# Patient Record
Sex: FEMALE | Race: BLACK OR AFRICAN AMERICAN | NOT HISPANIC OR LATINO | Employment: PART TIME | URBAN - METROPOLITAN AREA
[De-identification: names, ages, dates, MRNs, and addresses within clinical notes are randomized per-mention and may not be internally consistent; named-entity substitution may affect disease eponyms.]

---

## 2017-04-23 ENCOUNTER — HOSPITAL ENCOUNTER (EMERGENCY)
Facility: HOSPITAL | Age: 27
Discharge: HOME | End: 2017-04-23
Attending: EMERGENCY MEDICINE | Admitting: EMERGENCY MEDICINE
Payer: COMMERCIAL

## 2017-04-23 VITALS
BODY MASS INDEX: 32.25 KG/M2 | TEMPERATURE: 98.3 F | SYSTOLIC BLOOD PRESSURE: 144 MMHG | DIASTOLIC BLOOD PRESSURE: 67 MMHG | WEIGHT: 160 LBS | HEIGHT: 59 IN | HEART RATE: 87 BPM | OXYGEN SATURATION: 100 % | RESPIRATION RATE: 18 BRPM

## 2017-04-23 DIAGNOSIS — J02.9 SORE THROAT: Primary | ICD-10-CM

## 2017-04-23 LAB — S PYO AG THROAT QL: NEGATIVE

## 2017-04-23 PROCEDURE — 87430 STREP A AG IA: CPT | Performed by: NURSE PRACTITIONER

## 2017-04-23 PROCEDURE — 99283 EMERGENCY DEPT VISIT LOW MDM: CPT

## 2017-04-23 PROCEDURE — 87070 CULTURE OTHR SPECIMN AEROBIC: CPT | Performed by: NURSE PRACTITIONER

## 2017-04-23 RX ORDER — NAPROXEN 500 MG/1
500 TABLET ORAL 2 TIMES DAILY WITH MEALS
Qty: 10 TABLET | Refills: 0 | Status: SHIPPED | OUTPATIENT
Start: 2017-04-23 | End: 2018-01-17 | Stop reason: HOSPADM

## 2017-04-25 ENCOUNTER — HOSPITAL ENCOUNTER (EMERGENCY)
Facility: HOSPITAL | Age: 27
Discharge: HOME | End: 2017-04-25
Attending: EMERGENCY MEDICINE
Payer: COMMERCIAL

## 2017-04-25 VITALS
SYSTOLIC BLOOD PRESSURE: 132 MMHG | BODY MASS INDEX: 31.61 KG/M2 | DIASTOLIC BLOOD PRESSURE: 78 MMHG | RESPIRATION RATE: 18 BRPM | WEIGHT: 156.53 LBS | HEART RATE: 100 BPM | OXYGEN SATURATION: 98 % | TEMPERATURE: 98.9 F

## 2017-04-25 DIAGNOSIS — H10.33 ACUTE CONJUNCTIVITIS OF BOTH EYES, UNSPECIFIED ACUTE CONJUNCTIVITIS TYPE: Primary | ICD-10-CM

## 2017-04-25 LAB — BACTERIA THROAT CULT: NORMAL

## 2017-04-25 PROCEDURE — 99282 EMERGENCY DEPT VISIT SF MDM: CPT

## 2017-04-25 RX ORDER — SULFACETAMIDE SODIUM 100 MG/ML
2 SOLUTION/ DROPS OPHTHALMIC 4 TIMES DAILY
Qty: 15 ML | Refills: 0 | Status: SHIPPED | OUTPATIENT
Start: 2017-04-25 | End: 2017-05-05

## 2017-04-25 RX ORDER — SULFACETAMIDE SODIUM 100 MG/ML
2 SOLUTION/ DROPS OPHTHALMIC ONCE
Status: COMPLETED | OUTPATIENT
Start: 2017-04-25 | End: 2017-04-25

## 2017-04-25 RX ADMIN — SULFACETAMIDE SODIUM 2 DROP: 100 SOLUTION OPHTHALMIC at 05:14

## 2017-07-05 ENCOUNTER — ALLSCRIPTS OFFICE VISIT (OUTPATIENT)
Dept: OTHER | Facility: OTHER | Age: 27
End: 2017-07-05

## 2017-07-05 ENCOUNTER — OB ABSTRACT (OUTPATIENT)
Dept: OBGYN CLINIC | Facility: CLINIC | Age: 27
End: 2017-07-05

## 2017-07-05 DIAGNOSIS — O30.039 MONOCHORIONIC DIAMNIOTIC TWIN PREGNANCY: ICD-10-CM

## 2017-07-05 DIAGNOSIS — Z34.92 ENCOUNTER FOR SUPERVISION OF NORMAL PREGNANCY IN SECOND TRIMESTER: ICD-10-CM

## 2017-07-06 ENCOUNTER — ALLSCRIPTS OFFICE VISIT (OUTPATIENT)
Dept: PERINATAL CARE | Facility: CLINIC | Age: 27
End: 2017-07-06
Payer: COMMERCIAL

## 2017-07-06 ENCOUNTER — GENERIC CONVERSION - ENCOUNTER (OUTPATIENT)
Dept: OTHER | Facility: OTHER | Age: 27
End: 2017-07-06

## 2017-07-06 PROCEDURE — 76801 OB US < 14 WKS SINGLE FETUS: CPT | Performed by: OBSTETRICS & GYNECOLOGY

## 2017-07-06 PROCEDURE — 76802 OB US < 14 WKS ADDL FETUS: CPT | Performed by: OBSTETRICS & GYNECOLOGY

## 2017-07-17 ENCOUNTER — GENERIC CONVERSION - ENCOUNTER (OUTPATIENT)
Dept: OTHER | Facility: OTHER | Age: 27
End: 2017-07-17

## 2017-07-18 ENCOUNTER — LAB REQUISITION (OUTPATIENT)
Dept: LAB | Facility: HOSPITAL | Age: 27
End: 2017-07-18
Payer: COMMERCIAL

## 2017-07-18 DIAGNOSIS — O30.039 MONOCHORIONIC DIAMNIOTIC TWIN PREGNANCY: ICD-10-CM

## 2017-07-18 PROCEDURE — 87491 CHLMYD TRACH DNA AMP PROBE: CPT | Performed by: NURSE PRACTITIONER

## 2017-07-18 PROCEDURE — 87591 N.GONORRHOEAE DNA AMP PROB: CPT | Performed by: NURSE PRACTITIONER

## 2017-07-19 LAB
CHLAMYDIA DNA CVX QL NAA+PROBE: NORMAL
N GONORRHOEA DNA GENITAL QL NAA+PROBE: NORMAL

## 2017-07-27 ENCOUNTER — GENERIC CONVERSION - ENCOUNTER (OUTPATIENT)
Dept: OTHER | Facility: OTHER | Age: 27
End: 2017-07-27

## 2017-08-03 ENCOUNTER — GENERIC CONVERSION - ENCOUNTER (OUTPATIENT)
Dept: OTHER | Facility: OTHER | Age: 27
End: 2017-08-03

## 2017-08-03 ENCOUNTER — ALLSCRIPTS OFFICE VISIT (OUTPATIENT)
Dept: PERINATAL CARE | Facility: CLINIC | Age: 27
End: 2017-08-03
Payer: COMMERCIAL

## 2017-08-03 PROCEDURE — 76801 OB US < 14 WKS SINGLE FETUS: CPT | Performed by: OBSTETRICS & GYNECOLOGY

## 2017-08-03 PROCEDURE — 76802 OB US < 14 WKS ADDL FETUS: CPT | Performed by: OBSTETRICS & GYNECOLOGY

## 2017-08-03 PROCEDURE — 76814 OB US NUCHAL MEAS ADD-ON: CPT | Performed by: OBSTETRICS & GYNECOLOGY

## 2017-08-03 PROCEDURE — 76813 OB US NUCHAL MEAS 1 GEST: CPT | Performed by: OBSTETRICS & GYNECOLOGY

## 2017-08-09 ENCOUNTER — APPOINTMENT (OUTPATIENT)
Dept: LAB | Facility: CLINIC | Age: 27
End: 2017-08-09
Payer: COMMERCIAL

## 2017-08-09 ENCOUNTER — LAB REQUISITION (OUTPATIENT)
Dept: LAB | Facility: HOSPITAL | Age: 27
End: 2017-08-09
Payer: COMMERCIAL

## 2017-08-09 ENCOUNTER — TRANSCRIBE ORDERS (OUTPATIENT)
Dept: LAB | Facility: CLINIC | Age: 27
End: 2017-08-09

## 2017-08-09 DIAGNOSIS — Z34.92 ENCOUNTER FOR SUPERVISION OF NORMAL PREGNANCY IN SECOND TRIMESTER: ICD-10-CM

## 2017-08-09 DIAGNOSIS — O30.039 MONOCHORIONIC DIAMNIOTIC TWIN PREGNANCY: ICD-10-CM

## 2017-08-09 DIAGNOSIS — Z34.90 ENCOUNTER FOR SUPERVISION OF NORMAL PREGNANCY: ICD-10-CM

## 2017-08-09 LAB
ABO GROUP BLD: NORMAL
BACTERIA UR QL AUTO: ABNORMAL /HPF
BASOPHILS # BLD AUTO: 0.01 THOUSANDS/ΜL (ref 0–0.1)
BASOPHILS NFR BLD AUTO: 0 % (ref 0–1)
BILIRUB UR QL STRIP: NEGATIVE
BLD GP AB SCN SERPL QL: NEGATIVE
CLARITY UR: ABNORMAL
COLOR UR: YELLOW
EOSINOPHIL # BLD AUTO: 0.07 THOUSAND/ΜL (ref 0–0.61)
EOSINOPHIL NFR BLD AUTO: 1 % (ref 0–6)
ERYTHROCYTE [DISTWIDTH] IN BLOOD BY AUTOMATED COUNT: 14 % (ref 11.6–15.1)
GLUCOSE 1H P 50 G GLC PO SERPL-MCNC: 153 MG/DL
GLUCOSE UR STRIP-MCNC: ABNORMAL MG/DL
HBV SURFACE AG SER QL: NORMAL
HCT VFR BLD AUTO: 37.7 % (ref 34.8–46.1)
HGB BLD-MCNC: 12.6 G/DL (ref 11.5–15.4)
HGB UR QL STRIP.AUTO: NEGATIVE
HYALINE CASTS #/AREA URNS LPF: ABNORMAL /LPF
KETONES UR STRIP-MCNC: ABNORMAL MG/DL
LEUKOCYTE ESTERASE UR QL STRIP: ABNORMAL
LYMPHOCYTES # BLD AUTO: 2.13 THOUSANDS/ΜL (ref 0.6–4.47)
LYMPHOCYTES NFR BLD AUTO: 22 % (ref 14–44)
MCH RBC QN AUTO: 29.4 PG (ref 26.8–34.3)
MCHC RBC AUTO-ENTMCNC: 33.4 G/DL (ref 31.4–37.4)
MCV RBC AUTO: 88 FL (ref 82–98)
MONOCYTES # BLD AUTO: 0.31 THOUSAND/ΜL (ref 0.17–1.22)
MONOCYTES NFR BLD AUTO: 3 % (ref 4–12)
NEUTROPHILS # BLD AUTO: 7.3 THOUSANDS/ΜL (ref 1.85–7.62)
NEUTS SEG NFR BLD AUTO: 74 % (ref 43–75)
NITRITE UR QL STRIP: NEGATIVE
NON-SQ EPI CELLS URNS QL MICRO: ABNORMAL /HPF
NRBC BLD AUTO-RTO: 0 /100 WBCS
PH UR STRIP.AUTO: 6 [PH] (ref 4.5–8)
PLATELET # BLD AUTO: 242 THOUSANDS/UL (ref 149–390)
PMV BLD AUTO: 12.8 FL (ref 8.9–12.7)
PROT UR STRIP-MCNC: ABNORMAL MG/DL
RBC # BLD AUTO: 4.29 MILLION/UL (ref 3.81–5.12)
RBC #/AREA URNS AUTO: ABNORMAL /HPF
RH BLD: POSITIVE
RUBV IGG SERPL IA-ACNC: 23.5 IU/ML
SP GR UR STRIP.AUTO: 1.03 (ref 1–1.03)
SPECIMEN EXPIRATION DATE: NORMAL
UROBILINOGEN UR QL STRIP.AUTO: 0.2 E.U./DL
WBC # BLD AUTO: 9.85 THOUSAND/UL (ref 4.31–10.16)
WBC #/AREA URNS AUTO: ABNORMAL /HPF

## 2017-08-09 PROCEDURE — 81001 URINALYSIS AUTO W/SCOPE: CPT

## 2017-08-09 PROCEDURE — 36415 COLL VENOUS BLD VENIPUNCTURE: CPT

## 2017-08-09 PROCEDURE — 80081 OBSTETRIC PANEL INC HIV TSTG: CPT

## 2017-08-09 PROCEDURE — 82950 GLUCOSE TEST: CPT

## 2017-08-09 PROCEDURE — 87086 URINE CULTURE/COLONY COUNT: CPT

## 2017-08-09 PROCEDURE — 83020 HEMOGLOBIN ELECTROPHORESIS: CPT

## 2017-08-10 ENCOUNTER — GENERIC CONVERSION - ENCOUNTER (OUTPATIENT)
Dept: OTHER | Facility: OTHER | Age: 27
End: 2017-08-10

## 2017-08-10 LAB
BACTERIA UR CULT: NORMAL
RPR SER QL: NORMAL

## 2017-08-11 LAB — HIV 1+2 AB+HIV1 P24 AG SERPL QL IA: NORMAL

## 2017-08-13 ENCOUNTER — GENERIC CONVERSION - ENCOUNTER (OUTPATIENT)
Dept: OTHER | Facility: OTHER | Age: 27
End: 2017-08-13

## 2017-08-13 DIAGNOSIS — O30.039 MONOCHORIONIC DIAMNIOTIC TWIN PREGNANCY: ICD-10-CM

## 2017-08-13 DIAGNOSIS — R73.02 IMPAIRED GLUCOSE TOLERANCE: ICD-10-CM

## 2017-08-14 LAB
HGB A MFR BLD: 2.4 % (ref 0.7–3.1)
HGB A MFR BLD: 97.6 % (ref 94–98)
HGB C MFR BLD: 0 %
HGB F MFR BLD: 0 % (ref 0–2)
HGB FRACT BLD-IMP: NORMAL
HGB S BLD QL SOLY: NEGATIVE
HGB S MFR BLD: 0 %

## 2017-08-22 ENCOUNTER — GENERIC CONVERSION - ENCOUNTER (OUTPATIENT)
Dept: OTHER | Facility: OTHER | Age: 27
End: 2017-08-22

## 2017-08-24 ENCOUNTER — ALLSCRIPTS OFFICE VISIT (OUTPATIENT)
Dept: PERINATAL CARE | Facility: CLINIC | Age: 27
End: 2017-08-24
Payer: COMMERCIAL

## 2017-08-24 ENCOUNTER — GENERIC CONVERSION - ENCOUNTER (OUTPATIENT)
Dept: OTHER | Facility: OTHER | Age: 27
End: 2017-08-24

## 2017-08-24 PROCEDURE — 76817 TRANSVAGINAL US OBSTETRIC: CPT | Performed by: OBSTETRICS & GYNECOLOGY

## 2017-08-24 PROCEDURE — 76810 OB US >/= 14 WKS ADDL FETUS: CPT | Performed by: OBSTETRICS & GYNECOLOGY

## 2017-08-24 PROCEDURE — 76805 OB US >/= 14 WKS SNGL FETUS: CPT | Performed by: OBSTETRICS & GYNECOLOGY

## 2017-08-26 ENCOUNTER — APPOINTMENT (OUTPATIENT)
Dept: LAB | Facility: CLINIC | Age: 27
End: 2017-08-26
Payer: COMMERCIAL

## 2017-08-26 DIAGNOSIS — O30.039 MONOCHORIONIC DIAMNIOTIC TWIN PREGNANCY: ICD-10-CM

## 2017-08-26 DIAGNOSIS — R73.02 IMPAIRED GLUCOSE TOLERANCE: ICD-10-CM

## 2017-08-26 LAB
GLUCOSE 1H P 100 G GLC PO SERPL-MCNC: 131 MG/DL (ref 65–179)
GLUCOSE 2H P 100 G GLC PO SERPL-MCNC: 97 MG/DL (ref 65–154)
GLUCOSE 3H P 100 G GLC PO SERPL-MCNC: 102 MG/DL (ref 65–139)
GLUCOSE P FAST SERPL-MCNC: 84 MG/DL (ref 65–99)

## 2017-08-26 PROCEDURE — 82951 GLUCOSE TOLERANCE TEST (GTT): CPT

## 2017-08-26 PROCEDURE — 82952 GTT-ADDED SAMPLES: CPT

## 2017-08-26 PROCEDURE — 82948 REAGENT STRIP/BLOOD GLUCOSE: CPT

## 2017-08-26 PROCEDURE — 36415 COLL VENOUS BLD VENIPUNCTURE: CPT

## 2017-08-28 LAB — GLUCOSE SERPL-MCNC: 80 MG/DL (ref 65–140)

## 2017-09-07 ENCOUNTER — GENERIC CONVERSION - ENCOUNTER (OUTPATIENT)
Dept: OTHER | Facility: OTHER | Age: 27
End: 2017-09-07

## 2017-09-07 ENCOUNTER — ALLSCRIPTS OFFICE VISIT (OUTPATIENT)
Dept: PERINATAL CARE | Facility: CLINIC | Age: 27
End: 2017-09-07
Payer: COMMERCIAL

## 2017-09-07 PROCEDURE — 76821 MIDDLE CEREBRAL ARTERY ECHO: CPT | Performed by: OBSTETRICS & GYNECOLOGY

## 2017-09-07 PROCEDURE — 76816 OB US FOLLOW-UP PER FETUS: CPT | Performed by: OBSTETRICS & GYNECOLOGY

## 2017-09-13 ENCOUNTER — GENERIC CONVERSION - ENCOUNTER (OUTPATIENT)
Dept: OTHER | Facility: OTHER | Age: 27
End: 2017-09-13

## 2017-09-20 ENCOUNTER — GENERIC CONVERSION - ENCOUNTER (OUTPATIENT)
Dept: OTHER | Facility: OTHER | Age: 27
End: 2017-09-20

## 2017-09-20 ENCOUNTER — ALLSCRIPTS OFFICE VISIT (OUTPATIENT)
Dept: PERINATAL CARE | Facility: CLINIC | Age: 27
End: 2017-09-20
Payer: COMMERCIAL

## 2017-09-20 PROCEDURE — 76812 OB US DETAILED ADDL FETUS: CPT | Performed by: OBSTETRICS & GYNECOLOGY

## 2017-09-20 PROCEDURE — 76821 MIDDLE CEREBRAL ARTERY ECHO: CPT | Performed by: OBSTETRICS & GYNECOLOGY

## 2017-09-20 PROCEDURE — 76811 OB US DETAILED SNGL FETUS: CPT | Performed by: OBSTETRICS & GYNECOLOGY

## 2017-09-20 PROCEDURE — 76817 TRANSVAGINAL US OBSTETRIC: CPT | Performed by: OBSTETRICS & GYNECOLOGY

## 2017-10-05 ENCOUNTER — GENERIC CONVERSION - ENCOUNTER (OUTPATIENT)
Dept: OTHER | Facility: OTHER | Age: 27
End: 2017-10-05

## 2017-10-05 ENCOUNTER — APPOINTMENT (OUTPATIENT)
Dept: PERINATAL CARE | Facility: CLINIC | Age: 27
End: 2017-10-05
Payer: COMMERCIAL

## 2017-10-05 PROCEDURE — 76827 ECHO EXAM OF FETAL HEART: CPT | Performed by: OBSTETRICS & GYNECOLOGY

## 2017-10-05 PROCEDURE — 76816 OB US FOLLOW-UP PER FETUS: CPT | Performed by: OBSTETRICS & GYNECOLOGY

## 2017-10-05 PROCEDURE — 76825 ECHO EXAM OF FETAL HEART: CPT | Performed by: OBSTETRICS & GYNECOLOGY

## 2017-10-05 PROCEDURE — 93325 DOPPLER ECHO COLOR FLOW MAPG: CPT | Performed by: OBSTETRICS & GYNECOLOGY

## 2017-10-09 ENCOUNTER — ALLSCRIPTS OFFICE VISIT (OUTPATIENT)
Dept: OTHER | Facility: OTHER | Age: 27
End: 2017-10-09

## 2017-10-09 DIAGNOSIS — M79.606 PAIN OF LOWER EXTREMITY: ICD-10-CM

## 2017-10-09 DIAGNOSIS — Z34.82 ENCOUNTER FOR SUPERVISION OF OTHER NORMAL PREGNANCY, SECOND TRIMESTER: ICD-10-CM

## 2017-10-11 ENCOUNTER — GENERIC CONVERSION - ENCOUNTER (OUTPATIENT)
Dept: OTHER | Facility: OTHER | Age: 27
End: 2017-10-11

## 2017-11-01 ENCOUNTER — GENERIC CONVERSION - ENCOUNTER (OUTPATIENT)
Dept: OTHER | Facility: OTHER | Age: 27
End: 2017-11-01

## 2017-11-01 ENCOUNTER — APPOINTMENT (OUTPATIENT)
Dept: PERINATAL CARE | Facility: CLINIC | Age: 27
End: 2017-11-01
Payer: COMMERCIAL

## 2017-11-01 PROCEDURE — 76821 MIDDLE CEREBRAL ARTERY ECHO: CPT | Performed by: OBSTETRICS & GYNECOLOGY

## 2017-11-01 PROCEDURE — 76816 OB US FOLLOW-UP PER FETUS: CPT | Performed by: OBSTETRICS & GYNECOLOGY

## 2017-11-06 ENCOUNTER — GENERIC CONVERSION - ENCOUNTER (OUTPATIENT)
Dept: OTHER | Facility: OTHER | Age: 27
End: 2017-11-06

## 2017-11-08 ENCOUNTER — GENERIC CONVERSION - ENCOUNTER (OUTPATIENT)
Dept: OTHER | Facility: OTHER | Age: 27
End: 2017-11-08

## 2017-11-08 ENCOUNTER — LAB REQUISITION (OUTPATIENT)
Dept: LAB | Facility: HOSPITAL | Age: 27
End: 2017-11-08
Payer: COMMERCIAL

## 2017-11-08 ENCOUNTER — ALLSCRIPTS OFFICE VISIT (OUTPATIENT)
Dept: OTHER | Facility: OTHER | Age: 27
End: 2017-11-08

## 2017-11-08 DIAGNOSIS — N39.0 URINARY TRACT INFECTION: ICD-10-CM

## 2017-11-08 DIAGNOSIS — Z34.82 ENCOUNTER FOR SUPERVISION OF OTHER NORMAL PREGNANCY, SECOND TRIMESTER: ICD-10-CM

## 2017-11-08 PROCEDURE — 87086 URINE CULTURE/COLONY COUNT: CPT | Performed by: OBSTETRICS & GYNECOLOGY

## 2017-11-10 LAB — BACTERIA UR CULT: NORMAL

## 2017-11-22 ENCOUNTER — ALLSCRIPTS OFFICE VISIT (OUTPATIENT)
Dept: OTHER | Facility: OTHER | Age: 27
End: 2017-11-22

## 2017-11-22 ENCOUNTER — GENERIC CONVERSION - ENCOUNTER (OUTPATIENT)
Dept: OTHER | Facility: OTHER | Age: 27
End: 2017-11-22

## 2017-11-22 DIAGNOSIS — Z34.82 ENCOUNTER FOR SUPERVISION OF OTHER NORMAL PREGNANCY, SECOND TRIMESTER: ICD-10-CM

## 2017-11-28 ENCOUNTER — APPOINTMENT (OUTPATIENT)
Dept: PERINATAL CARE | Facility: MEDICAL CENTER | Age: 27
End: 2017-11-28
Payer: COMMERCIAL

## 2017-11-28 ENCOUNTER — GENERIC CONVERSION - ENCOUNTER (OUTPATIENT)
Dept: OTHER | Facility: OTHER | Age: 27
End: 2017-11-28

## 2017-11-28 PROCEDURE — 76816 OB US FOLLOW-UP PER FETUS: CPT | Performed by: OBSTETRICS & GYNECOLOGY

## 2017-11-28 PROCEDURE — 76821 MIDDLE CEREBRAL ARTERY ECHO: CPT | Performed by: OBSTETRICS & GYNECOLOGY

## 2017-12-14 ENCOUNTER — GENERIC CONVERSION - ENCOUNTER (OUTPATIENT)
Dept: OTHER | Facility: OTHER | Age: 27
End: 2017-12-14

## 2017-12-15 ENCOUNTER — APPOINTMENT (OUTPATIENT)
Dept: PERINATAL CARE | Facility: MEDICAL CENTER | Age: 27
End: 2017-12-15
Payer: COMMERCIAL

## 2017-12-15 ENCOUNTER — GENERIC CONVERSION - ENCOUNTER (OUTPATIENT)
Dept: OTHER | Facility: OTHER | Age: 27
End: 2017-12-15

## 2017-12-15 PROCEDURE — 76816 OB US FOLLOW-UP PER FETUS: CPT | Performed by: OBSTETRICS & GYNECOLOGY

## 2017-12-15 PROCEDURE — 76821 MIDDLE CEREBRAL ARTERY ECHO: CPT | Performed by: OBSTETRICS & GYNECOLOGY

## 2017-12-22 ENCOUNTER — GENERIC CONVERSION - ENCOUNTER (OUTPATIENT)
Dept: OTHER | Facility: OTHER | Age: 27
End: 2017-12-22

## 2017-12-22 ENCOUNTER — APPOINTMENT (OUTPATIENT)
Dept: PERINATAL CARE | Facility: MEDICAL CENTER | Age: 27
End: 2017-12-22
Payer: COMMERCIAL

## 2017-12-22 PROCEDURE — 76821 MIDDLE CEREBRAL ARTERY ECHO: CPT | Performed by: OBSTETRICS & GYNECOLOGY

## 2017-12-22 PROCEDURE — 76816 OB US FOLLOW-UP PER FETUS: CPT | Performed by: OBSTETRICS & GYNECOLOGY

## 2017-12-22 PROCEDURE — 59025 FETAL NON-STRESS TEST: CPT | Performed by: OBSTETRICS & GYNECOLOGY

## 2018-01-02 ENCOUNTER — ALLSCRIPTS OFFICE VISIT (OUTPATIENT)
Dept: PERINATAL CARE | Facility: MEDICAL CENTER | Age: 28
End: 2018-01-02
Payer: COMMERCIAL

## 2018-01-02 ENCOUNTER — APPOINTMENT (OUTPATIENT)
Dept: PERINATAL CARE | Facility: MEDICAL CENTER | Age: 28
End: 2018-01-02
Payer: COMMERCIAL

## 2018-01-02 ENCOUNTER — GENERIC CONVERSION - ENCOUNTER (OUTPATIENT)
Dept: OTHER | Facility: OTHER | Age: 28
End: 2018-01-02

## 2018-01-02 PROCEDURE — 76816 OB US FOLLOW-UP PER FETUS: CPT | Performed by: OBSTETRICS & GYNECOLOGY

## 2018-01-02 PROCEDURE — 59025 FETAL NON-STRESS TEST: CPT | Performed by: OBSTETRICS & GYNECOLOGY

## 2018-01-09 ENCOUNTER — APPOINTMENT (OUTPATIENT)
Dept: PERINATAL CARE | Facility: MEDICAL CENTER | Age: 28
End: 2018-01-09
Payer: COMMERCIAL

## 2018-01-09 ENCOUNTER — GENERIC CONVERSION - ENCOUNTER (OUTPATIENT)
Dept: OTHER | Facility: OTHER | Age: 28
End: 2018-01-09

## 2018-01-09 PROBLEM — O30.039 MONOCHORIONIC DIAMNIOTIC TWIN GESTATION: Status: ACTIVE | Noted: 2018-01-09

## 2018-01-09 PROCEDURE — 76818 FETAL BIOPHYS PROFILE W/NST: CPT | Performed by: OBSTETRICS & GYNECOLOGY

## 2018-01-09 PROCEDURE — 76821 MIDDLE CEREBRAL ARTERY ECHO: CPT | Performed by: OBSTETRICS & GYNECOLOGY

## 2018-01-09 PROCEDURE — 76820 UMBILICAL ARTERY ECHO: CPT | Performed by: OBSTETRICS & GYNECOLOGY

## 2018-01-09 RX ORDER — CALCIUM GLUCONATE 45(500) MG
500 TABLET ORAL DAILY
COMMUNITY

## 2018-01-09 RX ORDER — FERROUS SULFATE 325(65) MG
325 TABLET ORAL
COMMUNITY

## 2018-01-09 RX ORDER — PRENATAL VIT/IRON FUM/FOLIC AC 27MG-0.8MG
1 TABLET ORAL
COMMUNITY

## 2018-01-09 RX ORDER — FOLIC ACID 1 MG/1
TABLET ORAL DAILY
COMMUNITY

## 2018-01-10 ENCOUNTER — GENERIC CONVERSION - ENCOUNTER (OUTPATIENT)
Dept: OTHER | Facility: OTHER | Age: 28
End: 2018-01-10

## 2018-01-10 ENCOUNTER — APPOINTMENT (OUTPATIENT)
Dept: LAB | Facility: HOSPITAL | Age: 28
End: 2018-01-10
Attending: OBSTETRICS & GYNECOLOGY
Payer: COMMERCIAL

## 2018-01-10 DIAGNOSIS — O30.039 MONOCHORIONIC DIAMNIOTIC TWIN PREGNANCY: ICD-10-CM

## 2018-01-10 PROCEDURE — 87653 STREP B DNA AMP PROBE: CPT

## 2018-01-10 NOTE — MISCELLANEOUS
Message  telephone call to patient to inform of need for 3hour GTT  Instructions for completion reviewed  Patient will pick-up slip and complete ASAP      Active Problems    1  Abnormal glucose in pregnancy, antepartum (648 83) (O99 810)   2  Abnormal glucose tolerance test (790 22) (R73 02)   3  Monochorionic diamniotic twin gestation (651 00,V91 02) (O30 039)    Current Meds   1  Calcium 500 MG Oral Tablet Chewable; 500 mg orally 2 times a day  Please avoid taking   it with iron tablets or with other medications with calcium in it like TUMS;   Therapy: 57JUZ0548 to (Evaluate:02Jan2018)  Requested for: 51XSG1738; Last   Rx:[id number] Ordered   2  Ferrous Sulfate 325 (65 Fe) MG Oral Tablet; Take 1 tab daily before bedtime on an   empty stomach or with orange juice; Therapy: 44BMB6955 to (Evaluate:02Jan2018)  Requested for: 11DFS3793; Last   Rx:[id number] Ordered   3  Folic Acid 1 MG Oral Tablet; TAKE 1 TABLET DAILY AS DIRECTED; Therapy: 63AIB0723 to (Evaluate:94Oqg2894)  Requested for: 91EGN8182; Last   Rx:[id number] Ordered   4  Prenatal 27-0 8 MG Oral Tablet; TAKE 1 TABLET DAILY AS DIRECTED; Therapy: 92ZUM6289 to (Evaluate:30Jun2018)  Requested for: 69QIG4391; Last   Rx:[id number] Ordered    Allergies    1  Penicillins    2  No Known Environmental Allergies   3   No Known Food Allergies    Signatures   Electronically signed by : Mirna Smallwood RN; Aug 22 2017  9:15AM EST                       (Author)

## 2018-01-10 NOTE — MISCELLANEOUS
Message  Return to work or school:   Izzy Tapia is under my professional care  She was seen in my office on 10/09/2017  PLEASE ALLOW PATIENT TO WORK FROM HOME AS OF 10/09/2017          Signatures   Electronically signed by : Pat [place], ; Oct  9 2017  5:25PM EST                       (Co-author)

## 2018-01-10 NOTE — PROGRESS NOTES
AUG 24 2017         RE: [place]                              To: [address]   MR#: [id number]                                   [address]   : [facility] [address], [address]   ENC: [phone]                             Fax: [phone]   (Exam #: [id number])      The LMP of this 32year old,  G5, P2-0-2-2 patient was unknown, her   working BURTON is FEB 10 80 and the current gestational age is 14 weeks 6   days by  [address] [place] [address]  A sonographic examination was performed on AUG   24 2017 using real time equipment  The ultrasound examination was   performed using abdominal & vaginal techniques  The patient has a BMI of   27 6  Her blood pressure today was 112/77  Earliest ultrasound found in her record: 17 8w4 and 8w6 giving an  BURTON   of 18 based on the larger of the twins        Fetus A   Cardiac motion was observed at 153 bpm       Fetus B   Cardiac motion was observed at 149 bpm       INDICATIONS      multiple gestation- monochorionic  diamniotic twins   TTS surveillence   cervical evaluation   early fetal anatomy and growth      Exam Types      Level I   Transvaginal      RESULTS      Fetus # 1 of 2   Breech presentation   Fetal growth appeared normal   Fetal position = Maternal Right   Placenta Location = Posterior   No placenta previa   Placenta Grade = I   Chorionicity = Monochorionic, Diamniotic      Fetus # 2 of 2   Breech presentation   Fetal growth appeared normal   Fetal position = Maternal Left   Placenta Location = Posterior   No placenta previa   Placenta Grade = I   Chorionicity = Monochorionic, Diamniotic      MEASUREMENTS (* Included In Average GA)      FETUS A   AC               9 4 cm        15 weeks 1 day * (44%)   BPD              3 4 cm        16 weeks 3 days*   HC              12 1 cm        16 weeks 0 days*   Femur            1 9 cm        15 weeks 4 days*      Cerebellum       1 5 cm 15 weeks 5 days      HC/AC           1 29   FL/AC           0 20   FL/BPD          0 56   EFW (Ac/Fl/Hc)   128 grams - 0 lbs 5 oz      Fetus A AVERAGE G  A  is 15 weeks 6 days +/- 7 days  FETUS B   AC               9 6 cm        15 weeks 2 days* (48%)   BPD              3 3 cm        16 weeks 2 days*   HC              12 3 cm        16 weeks 1 day *   Femur            1 7 cm        15 weeks 0 days*      Cerebellum       1 5 cm        16 weeks 0 days      HC/AC           1 28   FL/AC           0 18   FL/BPD          0 53   EFW (Ac/Fl/Hc)   126 grams - 0 lbs 4 oz      Fetus B AVERAGE G  A  is 15 weeks 5 days +/- 7 days  AMNIOTIC FLUID      Fetus A      Largest Vertical Pocket = 4 0 cm   Amniotic Fluid: Normal         Fetus B      Largest Vertical Pocket = 4 1 cm   Amniotic Fluid: Normal      CERVICAL EVALUATION      The cervix appeared normal (Ultrasound Examination)  SUPINE      Cervical Length: 4 10 cm      OTHER TEST RESULTS           Funneling?: No             Dynamic Changes?: No        Resp  To TFP?: No                      Debris?: No      ANATOMY      Fetus A   Head                                    See Details   Face/Neck                               See Details   Th  Cav  Normal   Heart                                   See Details   Abd  Cav  Not Visualized   Stomach                                 Normal   Right Kidney                            Normal   Left Kidney                             Normal   Bladder                                 Normal   Abd  Wall                               Normal   Spine                                   Normal   Extrems                                 Normal   Genitalia                               Normal   Placenta                                Normal   Umbl   Cord                              Normal   Uterus                                  Normal   PCI Normal      Fetus B   Head                                    See Details   Face/Neck                               See Details   Th  Cav  Normal   Heart                                   See Details   Abd  Cav  Not Visualized   Stomach                                 Normal   Right Kidney                            Normal   Left Kidney                             Normal   Bladder                                 Normal   Abd  Wall                               Normal   Spine                                   Not Visualized   Extrems                                 Normal   Genitalia                               Normal   Placenta                                Normal   Umbl  Cord                              Normal   Uterus                                  Normal   PCI                                     Normal      ANATOMY DETAILS      Fetus A   Visualized Appearing Sonographically Normal:   HEAD: (Calvarium, BPD Level, Choroid Plexus, Cerebellum, Cisterna Magna);      FACE/NECK: (Neck, Profile);    TH  CAV  : (Lungs, Diaphragm); HEART:   (Four Chamber View, Proximal Left Outflow, Proximal Right Outflow, 3VV,   Cardiac Axis, Cardiac Position);    STOMACH, RIGHT KIDNEY, LEFT KIDNEY,   BLADDER, ABD  WALL, SPINE: (Cervical Spine, Thoracic Spine, Lumbar Spine,   Sacrum);    EXTREMS: (Lt Humerus, Rt Humerus, Lt Forearm, Rt Forearm, Lt   Hand, Rt Hand, Lt Femur, Rt Femur, Lt Low Leg, Rt Low Leg, Lt Foot, Rt   Foot);    GENITALIA, PLACENTA, UMBL  CORD, UTERUS, PCI      Not Visualized:   HEAD: (Cavum, Lateral Ventricles);    FACE/NECK: (Orbits, Nose/Lips,   Palate, Face); HEART: (3 Vessel Trachea, Short Axis of Greater Vessels,   Ductal Arch, Aortic Arch, Interventricular Septum, Interatrial Septum,   IVC, SVC);    ABD  CAV        Fetus B   Visualized Appearing Sonographically Normal:   HEAD: (Calvarium, BPD Level, Choroid Plexus, Cerebellum, Cisterna Magna); FACE/NECK: (Neck, Profile);    TH  CAV  : (Lungs, Diaphragm); HEART:   (Four Chamber View, Proximal Left Outflow, Proximal Right Outflow, Cardiac   Axis, Cardiac Position);    STOMACH, RIGHT KIDNEY, LEFT KIDNEY, BLADDER,   ABD  WALL, EXTREMS: (Lt Humerus, Rt Humerus, Lt Forearm, Rt Forearm, Lt   Hand, Rt Hand, Lt Femur, Rt Femur, Lt Low Leg, Rt Low Leg, Lt Foot, Rt   Foot);    GENITALIA, PLACENTA, UMBL  CORD, UTERUS, PCI      Not Visualized:   HEAD: (Cavum, Lateral Ventricles);    FACE/NECK: (Orbits, Nose/Lips,   Palate, Face); HEART: (3VV, 3 Vessel Trachea, Short Axis of Greater   Vessels, Ductal Arch, Aortic Arch, Interventricular Septum, Interatrial   Septum, IVC, SVC);    ABD  CAV , SPINE      ANATOMY COMMENTS      FETUS A   Her survey of the fetal anatomy is not complete  No fetal structural abnormality or ultrasound marker for aneuploidy is   identified on the Level II ultrasound study today  The missed or limited   views above are secondary to her early gestational age and fetal position  Fetal growth and amniotic fluid volume appear normal   Active movement of   the fetal body & extremities was seen  There is no suspicion of a   subchorionic bleed  The placental cord insertion was normal       FETUS B   Her survey of the fetal anatomy is not complete  No fetal structural abnormality or ultrasound marker for aneuploidy is   identified on the Level II ultrasound study today  The missed or limited   views above are secondary to her early gestational age and fetal position  Fetal growth and amniotic fluid volume appear normal   Active movement of   the fetal body & extremities was seen  There is no suspicion of a   subchorionic bleed  The placental cord insertion was normal       ADNEXA      The left ovary appeared normal and measured 3 7 x 3 2 x 2 3 cm with a   volume of 14 2 cc  The right ovary appeared normal and measured 3 4 x 1 6   x 1 9 cm with a volume of 5 4 cc  IMPRESSION      Twin IUP (Fetus A)   15 weeks and 6 days by this ultrasound  (BURTON=FEB 9 2018)   15 weeks and 6 days by 1st Tri Sono  (BURTON=FEB 9 2018)   Breech presentation   Fetal growth appeared normal   Regular fetal heart rate of 153 bpm   Posterior placenta   No placenta previa   Fetal position = Maternal , Right   Monochorionic, diamniotic      Twin IUP (Fetus B)   15 weeks and 5 days by this ultrasound  (BURTON=FEB 10 2018)   15 weeks and 6 days by 1st Tri Sono  (BURTON=FEB 9 2018)   Breech presentation   Fetal growth appeared normal   Regular fetal heart rate of 149 bpm   Posterior placenta   No placenta previa   Fetal position = Maternal , Left   Monochorionic, diamniotic      GENERAL COMMENT      Review for TTTS in 2 weeks and her full anatomy scan is planned for 4   weeks  A fetal echo is planned for 6 weeks along with a review for TTTS  RADHA Potts M D     Maternal-Fetal Medicine   Electronically signed 08/24/17 11:17

## 2018-01-11 NOTE — PROGRESS NOTES
OCT 5 2017         RE: Wilmar [place]                              To: [address]   MR#: [id number]                                   [address]   : [facility] [address], [address] [facility]   ENC: [phone]:[id number]                             Fax: [phone]   (Exam #: [id number])      The LMP of this 32year old,  G5, P2-0-2-2 patient was unknown, her   working BURTON is FEB 10 80 and the current gestational age is 21 weeks 6   days by  [facility] [address]  A sonographic examination was performed on OCT   5 2017 using real time equipment  The ultrasound examination was performed   using abdominal technique  The patient has a BMI of 29 4  Her blood   pressure today was 119/79  Earliest ultrasound found in her record: 17 8w4 and 8w6 giving an  BURTON   of 18 based on the larger of the twins        Fetus A   Cardiac motion was observed at 147 bpm       Fetus B   Cardiac motion was observed at 146 bpm       INDICATIONS      multiple gestation- monochorionic  diamniotic twins      Exam Types      Fetal Echocardiogram   Level I      RESULTS      Fetus # 1 of 2   Breech presentation   Fetal position = Maternal Left   Placenta Location = Posterior   No placenta previa   Placenta Grade = I   Chorionicity = Monochorionic, Diamniotic      Fetus # 2 of 2   Vertex presentation   Fetal position = Maternal Right   Placenta Location = Posterior   No placenta previa   Placenta Grade = I   Chorionicity = Monochorionic, Diamniotic      AMNIOTIC FLUID      Fetus A      Largest Vertical Pocket = 3 7 cm   Amniotic Fluid: Normal         Fetus B      Largest Vertical Pocket = 4 5 cm   Amniotic Fluid: Normal      FETAL VESSELS      Fetus A                                  S/D   PI    RI    PSV   AEDV RF                                                    cm/s       Umbilical Artery                 1 24              No   No       Middle Cerebral Artery           1 41 20 00         Fetus B                                  S/D   PI    RI    PSV   AEDV RF                                                    cm/s       Umbilical Artery                 1 28              No   No       Middle Cerebral Artery           1 42        24 10      FETAL VESSELS      Fetus A                                 PVSys PVDia PASys  S/D   S/A   DVI   RF       Ductus Venosus:                                                No         Fetus B                                 PVSys PVDia PASys  S/D   S/A   DVI   RF       Ductus Venosus:                                                No      IMPRESSION      Twin IUP (Fetus A)   21 weeks and 6 days by 1st Tri Sono  (BURTON=FEB 9 2018)   Breech presentation   Regular fetal heart rate of 147 bpm   Posterior placenta   No placenta previa   Fetal position = Maternal , Left   Monochorionic, diamniotic      Twin IUP (Fetus B)   21 weeks and 6 days by 1st Tri Sono  (BURTON=FEB 9 2018)   Vertex presentation   Regular fetal heart rate of 146 bpm   Posterior placenta   No placenta previa   Fetal position = Maternal , Right   Monochorionic, diamniotic      GENERAL COMMENT      Fetal echocardiography was performed for the indication of a   monochorionic, diamniotic twin pregnancy  The hearts are in normal   anatomic positions within the left chest   All four chambers were   identified with normal, 45-degree leftward axis for each fetus  There is   no suspicion of echogenic intracardiac foci  Tricuspid regurgitation is   not present  Right and left ventricular and atrial sizes were concordant  The ventricular and atrial septi appear intact by 2D echo and color   Doppler  The aortas arise in normal anatomic relationships from the left   ventricles  The pulmonary arteries arise in normal anatomic relationships   from the right ventricles  The short axis and three vessel views appear   normal   The ductus arteroses joins the aortas in normal anatomic   relationships    The aortic arches, pulmonary veins, inferior and superior   vena cavae, and the right and left ventricular outflow tracts were   identified and appear normal  The flow velocities across the mitral,   tricuspid, aortic, pulmonary valves, aortic arch and ductus arteriosus,   appear normal  There is no evidence of an anatomical defect present within   the hearts  The fetal heart rates were regular throughout the exam   period  There is no suspicion of a fetal dysrhythmia  The umbilical and   middle cerebral artery, and ductus venosus, Doppler studies are normal       Detailed evaluations of fetal growth and anatomy were not performed at the   visit today  The amniotic fluid volumes are normal  Each fetal bladder   appears normal  There is no suspicion of evolving twin to twin transfusion   syndrome  The middle cerebral artery peak systolic velocities are normal    There is no suspicion of evolving twin anemia polycythemia sequence  Today's ultrasound findings were discussed in detail with [facility]  She   was advised of the findings and counseled about the limitations of fetal   echocardiography in detecting all forms of congenital anomalies  For   example, fetal echocardiography may not detect small septal defects and   minor valvular abnormalities  Other examples of difficult    diagnosis include post valvular stenosis, coarctation of the aorta, and   anomalous pulmonary venous return  RECOMMENDATION      Fetal Dopplers: 2 Weeks   Growth Ultrasound: 2 Weeks      RADHA Garnett , ADAN Juarez     Maternal-Fetal Medicine   Electronically signed 10/05/17 19:22

## 2018-01-11 NOTE — PROGRESS NOTES
SEP 7 2017         RE: Molly [place]ell                              To: [address]   MR#: [id number]                                   [address]   [facility] [address] [facility], [address]   ENC: [phone]                             Fax: [phone]   (Exam #: [id number])      The LMP of this 32year old,  G5, P2-0-2-2 patient was unknown, her   working BURTON is FEB 10 80 and the current gestational age is 12 weeks 6   days by  [address] [place] [address]  A sonographic examination was performed on SEP   7 2017 using real time equipment  The ultrasound examination was performed   using abdominal technique  The patient has a BMI of 28 7  Her blood   pressure today was 124/75  Earliest ultrasound found in her record: 17 8w4 and 8w6 giving an  BURTON   of 18 based on the larger of the twins        Fetus A   Cardiac motion was observed at 153 bpm       Fetus B   Cardiac motion was observed at 158 bpm       INDICATIONS      multiple gestation- monochorionic  diamniotic twins   TTS surveillence      Exam Types      Amniotic Fluid Index   Doppler study      RESULTS      Fetus # 1 of 2   Breech presentation   Fetal position = Maternal Right   Placenta Location = Posterior   No placenta previa   Placenta Grade = I   Chorionicity = Monochorionic, Diamniotic      Fetus # 2 of 2   Transverse presentation   Fetal position = Maternal Left   Placenta Location = Posterior   No placenta previa   Placenta Grade = I   Chorionicity = Monochorionic, Diamniotic      AMNIOTIC FLUID      Fetus A      Largest Vertical Pocket = 5 3 cm   Amniotic Fluid: Normal         Fetus B      Largest Vertical Pocket = 4 8 cm   Amniotic Fluid: Normal      FETAL VESSELS      Fetus A                                  S/D   PI    RI    PSV   AEDV RF                                                    cm/s       Middle Cerebral Artery                       27 10 No         Fetus B S/D   PI    RI    PSV   AEDV RF                                                    cm/s       Middle Cerebral Artery                       23 29 No      FETAL VESSELS      Fetus A                                 PVSys PVDia PASys  S/D   S/A   DVI   RF       Ductus Venosus:                                                No         Fetus B                                 PVSys PVDia PASys  S/D   S/A   DVI   RF       Ductus Venosus:                                                No      ANATOMY      Fetus A   Stomach                                 Normal   Bladder                                 Normal      Fetus B   Stomach                                 Normal   Bladder                                 Normal      IMPRESSION      Twin IUP (Fetus A)   17 weeks and 6 days by 1st Tri Sono  (BURTON=FEB 9 2018)   Breech presentation   Regular fetal heart rate of 153 bpm   Posterior placenta   No placenta previa   Fetal position = Maternal , Right   Monochorionic, diamniotic      Twin IUP (Fetus B)   17 weeks and 6 days by 1st Tri Sono  (BURTON=FEB 9 2018)   Transverse presentation   Regular fetal heart rate of 158 bpm   Posterior placenta   No placenta previa   Fetal position = Maternal , Left   Monochorionic, diamniotic      GENERAL Rosa M May presents today for a twin-twin transfusion evaluation given her   pregnancy with monochorionic twins  She is overall doing well without   significant complaints other than occasional leg cramping in her left leg   in the evening  Today's ultrasound evaluation is overall reassuring with   a normal amniotic fluid, stomach's, and bladders on each fetus  The   middle cerebral artery P systolic velocity for each fetus is overall   reassuring without evidence to suggest twin anemia polycythemia sequence  We discussed leg cramps during pregnancy and how common those symptoms   are    We discussed ensuring she is well-hydrated and getting adequate rest including vitamin supplementation to help alleviate her symptoms  She   does not have any significant swelling in her leg to suggest a venous   thrombosis  We discussed precautions thereof  I encouraged the patient   to communicate with her physicians if her symptoms continue  We discussed follow-up in detail and the patient is scheduled for her   detailed fetal anatomic evaluations in 2 weeks  Thank you very much for allowing us to participate in the care of this   very nice patient  Should you have any questions, please do not hesitate   to contact our office  Please note, in addition to the time spent discussing the results of the   ultrasound, I spent approximately 10 minutes of face-to-face time with the   patient, greater than 50% of which was spent in counseling and the   coordination of care for this patient  RADHA Craven , ADAN Murguia     Electronically signed 09/07/17 09:03

## 2018-01-11 NOTE — PROGRESS NOTES
SEP 20 2017         RE: Mary Jose                              To: [id number]   MR#: [id number]                                   [address]    : [address], [address] [place] [address]   ENC: [id number]                             Fax: [phone]   (Exam #: [id number])      The LMP of this 32year old,  G5, P2-0-2-2 patient was unknown, her   working BURTON is FEB 10 80 and the current gestational age is 24 weeks 5   days by  [address] [place] [address]  A sonographic examination was performed on SEP   20 2017 using real time equipment  The ultrasound examination was   performed using abdominal & vaginal techniques  The patient has a BMI of   29 3  Her blood pressure today was 131/80  Earliest ultrasound found in her record: 17 8w4 and 8w6 giving an  BURTON   of 18 based on the larger of the twins  Anahi Maldonado has no complaints  She denies abdominal or pelvic pain, vaginal   bleeding, mucoid vaginal discharge, or suspected PPROM  She reports fetal   movements        Fetus A   Cardiac motion was observed at 153 bpm       Fetus B   Cardiac motion was observed at 158 bpm       INDICATIONS      multiple gestation- monochorionic  diamniotic twins      Exam Types      LEVEL II   Transvaginal      RESULTS      Fetus # 1 of 2   Vertex presentation   Fetal growth appeared normal   Fetal position = Maternal Right   Placenta Location = Posterior   No placenta previa   Placenta Grade = I   Chorionicity = Monochorionic, Diamniotic      Fetus # 2 of 2   Vertex presentation   Fetal growth appeared normal   Fetal position = Superior, Maternal Left   Placenta Location = Posterior   No placenta previa   Placenta Grade = I   Chorionicity = Monochorionic, Diamniotic      MEASUREMENTS (* Included In Average GA)      FETUS A   AC              13 1 cm        18 weeks 2 days* (25%)   BPD              4 4 cm        19 weeks 3 days* (43%)   HC              16 5 cm 19 weeks 1 day * (32%)   Femur            3 0 cm        19 weeks 2 days* (32%)      Nuchal Fold      4 1 mm   NBL              6 0 mm      Humerus          2 9 cm        19 weeks 4 days  (48%)      Cerebellum       2 0 cm        19 weeks 6 days   Biorbit          3 2 cm        20 weeks 2 days   CisternaMagna    4 4 mm      HC/AC           1 26   FL/AC           0 23   FL/BPD          0 67   EFW (Ac/Fl/Hc)   262 grams - 0 lbs 9 oz      Fetus A AVERAGE G  A  is 19 weeks 0 days +/- 10 days  FETUS B   AC              14 0 cm        19 weeks 1 day * (40%)   BPD              4 5 cm        19 weeks 5 days* (52%)   HC              16 5 cm        19 weeks 1 day * (32%)   Femur            3 0 cm        19 weeks 3 days* (35%)      Nuchal Fold      3 2 mm   NBL              5 8 mm      Humerus          3 1 cm        20 weeks 1 day   (63%)      Cerebellum       2 0 cm        20 weeks 1 day   Biorbit          3 1 cm        20 weeks 0 days   CisternaMagna    4 2 mm      HC/AC           1 18   FL/AC           0 22   FL/BPD          0 66   EFW (Ac/Fl/Hc)   284 grams - 0 lbs 10 oz      Fetus B AVERAGE G  A  is 19 weeks 2 days +/- 10 days  AMNIOTIC FLUID      Fetus A      Largest Vertical Pocket = 6 1 cm   Amniotic Fluid: Normal         Fetus B      Largest Vertical Pocket = 7 9 cm   Amniotic Fluid: Normal      FETAL VESSELS      Fetus A                                  S/D   PI    RI    PSV   AEDV RF                                                    cm/s       Middle Cerebral Artery                       23 81         Fetus B                                  S/D   PI    RI    PSV   AEDV RF                                                    cm/s       Middle Cerebral Artery                       21 03      CERVICAL EVALUATION      The cervix appeared normal (Ultrasound Examination)  SUPINE      Cervical Length: 3 90 cm      OTHER TEST RESULTS           Funneling?: No             Dynamic Changes?: No        Resp   To TFP?: No                      Debris?: No      ANATOMY      Fetus A   Head                                    Normal   Face/Neck                               Normal   Th  Cav  Normal   Heart                                   Normal   Abd  Cav  Normal   Stomach                                 Normal   Right Kidney                            Normal   Left Kidney                             Normal   Bladder                                 Normal   Abd  Wall                               Normal   Spine                                   Normal   Extrems                                 Normal   Genitalia                               Normal   Placenta                                Normal   Umbl  Cord                              Normal   Uterus                                  Normal   PCI                                     Normal      Fetus B   Head                                    Normal   Face/Neck                               Normal   Th  Cav  Normal   Heart                                   Normal   Abd  Cav  Normal   Stomach                                 Normal   Right Kidney                            Normal   Left Kidney                             Normal   Bladder                                 Normal   Abd  Wall                               Normal   Spine                                   Normal   Extrems                                 Normal   Genitalia                               Normal   Placenta                                Normal   Umbl   Cord                              Normal   Uterus                                  Normal   PCI                                     Normal      ANATOMY DETAILS      Fetus A   Visualized Appearing Sonographically Normal:   HEAD: (Calvarium, BPD Level, Cavum, Lateral Ventricles, Choroid Plexus,   Cerebellum, Cisterna Magna);    FACE/NECK: (Neck, Nuchal Fold, Profile,   Orbits, Nose/Lips, Palate, Face);    TH  CAV  : (Lungs, Diaphragm); HEART: (Four Chamber View, Proximal Left Outflow, Proximal Right Outflow,   3VV, 3 Vessel Trachea, Short Axis of Greater Vessels, Ductal Arch, Aortic   Arch, Interventricular Septum, Interatrial Septum, IVC, SVC, Cardiac Axis,   Cardiac Position);    ABD  CAV : (Liver);    STOMACH, RIGHT KIDNEY, LEFT   KIDNEY, BLADDER, ABD  WALL, SPINE: (Cervical Spine, Thoracic Spine, Lumbar   Spine, Sacrum);    EXTREMS: (Lt Humerus, Rt Humerus, Lt Forearm, Rt   Forearm, Lt Hand, Rt Hand, Lt Femur, Rt Femur, Lt Low Leg, Rt Low Leg, Lt   Foot, Rt Foot);    GENITALIA (Male), PLACENTA, UMBL  CORD, UTERUS, PCI      Fetus B   Visualized Appearing Sonographically Normal:   HEAD: (Calvarium, BPD Level, Cavum, Lateral Ventricles, Choroid Plexus,   Cerebellum, Cisterna Magna);    FACE/NECK: (Neck, Nuchal Fold, Profile,   Orbits, Nose/Lips, Palate, Face);    TH  CAV  : (Lungs, Diaphragm); HEART: (Four Chamber View, Proximal Left Outflow, Proximal Right Outflow,   3VV, 3 Vessel Trachea, Short Axis of Greater Vessels, Ductal Arch, Aortic   Arch, Interventricular Septum, Interatrial Septum, IVC, SVC, Cardiac Axis,   Cardiac Position);    ABD  CAV : (Liver);    STOMACH, RIGHT KIDNEY, LEFT   KIDNEY, BLADDER, ABD  WALL, SPINE: (Cervical Spine, Thoracic Spine, Lumbar   Spine, Sacrum);    EXTREMS: (Lt Humerus, Rt Humerus, Lt Forearm, Rt   Forearm, Lt Hand, Rt Hand, Lt Femur, Rt Femur, Lt Low Leg, Rt Low Leg, Lt   Foot, Rt Foot);    GENITALIA (Male), PLACENTA, UMBL  CORD, UTERUS, PCI      ADNEXA      The left ovary appeared normal and measured 2 7 x 2 3 x 2 0 cm with a   volume of 6 5 cc  The right ovary was not visualized        IMPRESSION      Twin IUP (Fetus A)   19 weeks and 0 days by this ultrasound  (BURTON=FEB 14 2018)   19 weeks and 5 days by 1st Tri Sono  (BURTON=FEB 9 2018)   Vertex presentation   Fetal growth appeared normal   Normal anatomy survey   Regular fetal heart rate of 153 bpm   Posterior placenta   No placenta previa   Fetal position = Maternal , Right   Monochorionic, diamniotic      Twin IUP (Fetus B)   19 weeks and 2 days by this ultrasound  (BURTON=FEB 12 2018)   19 weeks and 5 days by 1st Tri Sono  (BURTON=FEB 9 2018)   Vertex presentation   Fetal growth appeared normal   Normal anatomy survey   Regular fetal heart rate of 158 bpm   Posterior placenta   No placenta previa   Fetal position = Superior, Left   Monochorionic, diamniotic      GENERAL COMMENT      No fetal structural abnormalities or ultrasound markers for aneuploidy is   identified on the Level II ultrasound studies today  Fetal growth and   amniotic fluid volumes are normal   The single posterior placenta is   normal in appearance  A thin dividing membrane is present  There is no   suspicion of evolving twin to twin transfusion syndrome  The middle   cerebral artery peak systolic velocities are normal  There is no suspicion   of evolving twin anemia polycythemia sequence  The cervix is normal in appearance by transvaginal sonography  The   cervical length is normal   Cervical debris is not present  Cervical   funneling is not present  Neither provocative nor dynamic change is   appreciated  Today's ultrasound findings and suggested follow-up were discussed in   detail with [facility]  We discussed that prenatal ultrasound cannot rule   out all congenital abnormalities  [facility] will return to the [facility] in 2 weeks for fetal echocardiography studies and TTTS   surveillance  Fetal growth scans will be repeated in 4 weeks, and then   serially throughout the remainder of the pregnancy  Serial TTTS   surveillance will also be performed  Non stress testing is recommended for   additional pregnancy surveillance beginning at 32 weeks gestation, sooner   if otherwise clinically indicated   Delivery of this monochorionic,   diamniotic twin pregnancy is recommended at 36-37 weeks gestation, sooner   if otherwise clinically indicated  The face to face time, in addition to time spent discussing ultrasound   results, was approximately 10 minutes, greater than 50% of which was spent   during counseling and coordination of care  RADHA Moore Cha, M D     Maternal-Fetal Medicine   Electronically signed 09/20/17 16:38

## 2018-01-12 ENCOUNTER — HOSPITAL ENCOUNTER (OUTPATIENT)
Dept: LABOR AND DELIVERY | Facility: HOSPITAL | Age: 28
Discharge: HOME | End: 2018-01-12
Payer: COMMERCIAL

## 2018-01-12 ENCOUNTER — HOSPITAL ENCOUNTER (OUTPATIENT)
Facility: HOSPITAL | Age: 28
Discharge: HOME | End: 2018-01-12
Attending: OBSTETRICS & GYNECOLOGY | Admitting: OBSTETRICS & GYNECOLOGY
Payer: COMMERCIAL

## 2018-01-12 VITALS
HEIGHT: 62 IN | DIASTOLIC BLOOD PRESSURE: 86 MMHG | BODY MASS INDEX: 29.44 KG/M2 | SYSTOLIC BLOOD PRESSURE: 131 MMHG | WEIGHT: 160 LBS

## 2018-01-12 VITALS
WEIGHT: 170 LBS | BODY MASS INDEX: 32.1 KG/M2 | HEIGHT: 61 IN | HEART RATE: 95 BPM | RESPIRATION RATE: 18 BRPM | TEMPERATURE: 98.4 F | SYSTOLIC BLOOD PRESSURE: 120 MMHG | OXYGEN SATURATION: 100 % | DIASTOLIC BLOOD PRESSURE: 72 MMHG

## 2018-01-12 DIAGNOSIS — O30.039 MONOCHORIONIC DIAMNIOTIC TWIN GESTATION: ICD-10-CM

## 2018-01-12 PROCEDURE — 99214 OFFICE O/P EST MOD 30 MIN: CPT

## 2018-01-12 NOTE — MISCELLANEOUS
Message  Phone call to pt, 31 yo G [phone] having vaginal spotting for the past 4 days, and is not decreasing  She denies any abdominal pain  LMP 4/23/2016, approx  6 wks5d  Has not had an US yet this pregnancy, has first OB appt on 6/21/2016  Blood type A+  Reviewed with pt to go to the ER for evaluation        Signatures   Electronically signed by : MAGDALENE Leong; Jun 9 2016  4:49PM EST                       (Author)

## 2018-01-12 NOTE — MISCELLANEOUS
Provider Comments  Provider Comments:   NO CALL NO SHOW PRE  APPT   CALL LEFT MESSAGE FOR PATIENT TO CALL AND RESCHEDULE      Signatures   Electronically signed by : Jeannie Mulligan DO; Nov  6 9871  7:57PM EST                       (Author)

## 2018-01-13 ENCOUNTER — HOSPITAL ENCOUNTER (INPATIENT)
Facility: HOSPITAL | Age: 28
LOS: 4 days | Discharge: HOME | DRG: 540 | End: 2018-01-17
Attending: OBSTETRICS & GYNECOLOGY | Admitting: OBSTETRICS & GYNECOLOGY
Payer: COMMERCIAL

## 2018-01-13 ENCOUNTER — HOSPITAL ENCOUNTER (OUTPATIENT)
Dept: LABOR AND DELIVERY | Facility: HOSPITAL | Age: 28
Discharge: HOME | DRG: 540 | End: 2018-01-13
Payer: COMMERCIAL

## 2018-01-13 VITALS
BODY MASS INDEX: 27.82 KG/M2 | SYSTOLIC BLOOD PRESSURE: 112 MMHG | HEIGHT: 62 IN | DIASTOLIC BLOOD PRESSURE: 77 MMHG | WEIGHT: 151.2 LBS

## 2018-01-13 VITALS
WEIGHT: 162 LBS | BODY MASS INDEX: 29.81 KG/M2 | DIASTOLIC BLOOD PRESSURE: 68 MMHG | SYSTOLIC BLOOD PRESSURE: 102 MMHG | OXYGEN SATURATION: 100 % | HEART RATE: 107 BPM | HEIGHT: 62 IN

## 2018-01-13 VITALS
HEIGHT: 62 IN | DIASTOLIC BLOOD PRESSURE: 82 MMHG | BODY MASS INDEX: 27.57 KG/M2 | WEIGHT: 149.8 LBS | SYSTOLIC BLOOD PRESSURE: 126 MMHG

## 2018-01-13 DIAGNOSIS — Z41.9 PATIENT-REQUESTED PROCEDURE: ICD-10-CM

## 2018-01-13 DIAGNOSIS — Z3A.36 36 WEEKS GESTATION OF PREGNANCY: ICD-10-CM

## 2018-01-13 DIAGNOSIS — O30.033 MONOCHORIONIC DIAMNIOTIC TWIN GESTATION IN THIRD TRIMESTER: Primary | ICD-10-CM

## 2018-01-13 DIAGNOSIS — IMO0001 TWINS: ICD-10-CM

## 2018-01-13 PROBLEM — Z30.2 REQUEST FOR STERILIZATION: Status: ACTIVE | Noted: 2018-01-13

## 2018-01-13 LAB
ABO GROUP BLD: NORMAL
BASOPHILS # BLD AUTO: 0.01 THOUSANDS/ΜL (ref 0–0.1)
BASOPHILS NFR BLD AUTO: 0 % (ref 0–1)
BLD GP AB SCN SERPL QL: NEGATIVE
EOSINOPHIL # BLD AUTO: 0.03 THOUSAND/ΜL (ref 0–0.61)
EOSINOPHIL NFR BLD AUTO: 0 % (ref 0–6)
ERYTHROCYTE [DISTWIDTH] IN BLOOD BY AUTOMATED COUNT: 17.3 % (ref 11.6–15.1)
GP B STREP DNA SPEC QL NAA+PROBE: NORMAL
HCT VFR BLD AUTO: 29.6 % (ref 34.8–46.1)
HGB BLD-MCNC: 8.9 G/DL (ref 11.5–15.4)
LYMPHOCYTES # BLD AUTO: 2.72 THOUSANDS/ΜL (ref 0.6–4.47)
LYMPHOCYTES NFR BLD AUTO: 31 % (ref 14–44)
MCH RBC QN AUTO: 22.5 PG (ref 26.8–34.3)
MCHC RBC AUTO-ENTMCNC: 30.1 G/DL (ref 31.4–37.4)
MCV RBC AUTO: 75 FL (ref 82–98)
MONOCYTES # BLD AUTO: 0.61 THOUSAND/ΜL (ref 0.17–1.22)
MONOCYTES NFR BLD AUTO: 7 % (ref 4–12)
NEUTROPHILS # BLD AUTO: 5.36 THOUSANDS/ΜL (ref 1.85–7.62)
NEUTS SEG NFR BLD AUTO: 62 % (ref 43–75)
NRBC BLD AUTO-RTO: 0 /100 WBCS
PLATELET # BLD AUTO: 273 THOUSANDS/UL (ref 149–390)
PMV BLD AUTO: 12.2 FL (ref 8.9–12.7)
RBC # BLD AUTO: 3.95 MILLION/UL (ref 3.81–5.12)
RH BLD: POSITIVE
SPECIMEN EXPIRATION DATE: NORMAL
WBC # BLD AUTO: 8.76 THOUSAND/UL (ref 4.31–10.16)

## 2018-01-13 PROCEDURE — 86923 COMPATIBILITY TEST ELECTRIC: CPT

## 2018-01-13 PROCEDURE — 86900 BLOOD TYPING SEROLOGIC ABO: CPT | Performed by: OBSTETRICS & GYNECOLOGY

## 2018-01-13 PROCEDURE — 85025 COMPLETE CBC W/AUTO DIFF WBC: CPT | Performed by: OBSTETRICS & GYNECOLOGY

## 2018-01-13 PROCEDURE — 86901 BLOOD TYPING SEROLOGIC RH(D): CPT | Performed by: OBSTETRICS & GYNECOLOGY

## 2018-01-13 PROCEDURE — 86850 RBC ANTIBODY SCREEN: CPT | Performed by: OBSTETRICS & GYNECOLOGY

## 2018-01-13 PROCEDURE — 86592 SYPHILIS TEST NON-TREP QUAL: CPT | Performed by: OBSTETRICS & GYNECOLOGY

## 2018-01-13 RX ORDER — SODIUM CHLORIDE, SODIUM LACTATE, POTASSIUM CHLORIDE, CALCIUM CHLORIDE 600; 310; 30; 20 MG/100ML; MG/100ML; MG/100ML; MG/100ML
125 INJECTION, SOLUTION INTRAVENOUS CONTINUOUS
Status: DISCONTINUED | OUTPATIENT
Start: 2018-01-13 | End: 2018-01-14

## 2018-01-13 RX ORDER — OXYTOCIN/RINGER'S LACTATE 30/500 ML
1-30 PLASTIC BAG, INJECTION (ML) INTRAVENOUS
Status: DISCONTINUED | OUTPATIENT
Start: 2018-01-13 | End: 2018-01-14

## 2018-01-13 RX ADMIN — Medication 2 MILLI-UNITS/MIN: at 17:31

## 2018-01-13 RX ADMIN — SODIUM CHLORIDE, SODIUM LACTATE, POTASSIUM CHLORIDE, AND CALCIUM CHLORIDE 125 ML/HR: .6; .31; .03; .02 INJECTION, SOLUTION INTRAVENOUS at 17:24

## 2018-01-13 RX ADMIN — Medication 4 MILLI-UNITS/MIN: at 23:20

## 2018-01-13 NOTE — PROGRESS NOTES
Triage Note - OB  Rajwinder Bench 29 y o  female MRN: [id number]  Unit/Bed#: LD Triage 3 Encounter: [id number]    Chief Complaint:   Chief Complaint   Patient presents with    Scheduled Induction       BURTON: Estimated Date of Delivery: 18    HPI: 29 y o  female [id number] at 36w0d with Mono/Di TIUP (VTX/VTX) presents for scheduled induction of labor for cervical ripening  FHR:   A: 140 / reactive / no decelerations  B: 140 / reactive / no decelerations    Welty: Q4 minutes    Vitals: Blood pressure 120/72, pulse 95, temperature 98 4 °F (36 9 °C), temperature source Oral, resp  rate 18, height 5' 1" (1 549 m), weight 77 1 kg (170 lb), last menstrual period 2017, SpO2 100 %, currently breastfeeding  ,Body mass index is 32 12 kg/m²  Physical Exam  Gen: NAD, AAOx3, Pleasant & Cooperative  Cv: RRR, No M/R/G  Resp: CTAB, No W/R/R  Abdomen: Gravid, nontender  Ext: Symmetric, non-tender  SVE: 3/60/-3    Bedside sono: VTX/VTX    Labs:   No visits with results within 1 Day(s) from this visit  Latest known visit with results is:   Lab Requisition on 2017   Component Date Value    Urine Culture 2017 No Growth <1000 cfu/mL        Lab, Imaging and other studies: I have personally reviewed pertinent reports  A/P: 28 y/o  @ 36w0d w/ [place]/Di Kathey Saint presents for induction of labor with cervical ripening  SVE 3/60/-3  Unable to admit for Pitocin titration at this time secondary to room restrictions  Patient given the option of staying overnight for induction in the morning Vs going home and coming back in the morning for induction  Patient opts for discharge home w/ return at [facility] for induction of labor     NST reactive x 2  Labor & kick count precautions reviewed    Discussed w/ Dr Caroline Jacinto

## 2018-01-13 NOTE — H&P
History & Physical - OB/GYN   Elvira Singh 29 y o  female MRN: [id number]  Unit/Bed#: LD Triage  Encounter: [id number]    20 y o  [id number] at 36w1d weeks  She is a patient of [address]    Chief complaint:  I'm here for my induction  HPI:  Contractions:  Yes, mild and irregular  Fetal movement:  yes  Vaginal bleeding:   no  Leaking of fluid:  no    Pt reports she is here for her induction of labor  She was sent home yesterday  She is pregnant with mono-di twins  Pregnancy Complications:  Patient Active Problem List   Diagnosis    Monochorionic diamniotic twin gestation   Emily Mare 42 weeks gestation of pregnancy    Monochorionic diamniotic twin gestation in third trimester    Request for sterilization       PMH:  Past Medical History:   Diagnosis Date    Gonorrhea        [address]  No past surgical history on file  OB Hx:  Obstetric History       T2      L2     SAB2   TAB0   Ectopic0   Multiple0   Live Births2       # Outcome Date GA Lbr Que/2nd Weight Sex Delivery Anes PTL Lv   5 Current            4 2016           3 Term 12 40w0d  3260 g (7 lb 3 oz) M Vag-Spont  N CIERRA   2 Term 08/10/11 40w0d  3345 g (7 lb 6 oz) M Vag-Spont None N CIERRA   1 2008                  Meds:  No current facility-administered medications on file prior to encounter  Current Outpatient Prescriptions on File Prior to Encounter   Medication Sig Dispense Refill    calcium gluconate 500 mg tablet Take 500 mg by mouth daily      ferrous sulfate 325 (65 Fe) mg tablet Take 325 mg by mouth daily with breakfast      folic acid (FOLVITE) 1 mg tablet Take by mouth daily      naproxen (NAPROSYN) 500 mg tablet Take 1 tablet by mouth 2 (two) times a day with meals for 5 days 10 tablet 0    prenatal vitamin (CLASSIC FORMULA) 27-0 8 mg Take 1 tablet by mouth every morning before breakfast         Allergies:   Allergies   Allergen Reactions    Penicillins Hives       Labs:  Blood type: A+  Antibody: negative  Group B strep: negative  HIV: negative  Hepatitis B: negative  RPR: Nonreactive  Rubella: Immune  Varicella Immune  1 hour Glucose: 153  3 hour Glucose: 84, 131, 97, 102    Physical Exam:  /77   Pulse (!) 108   Temp 99 4 °F (37 4 °C) (Oral)   Resp 20   Ht 5' 1" (1 549 m)   Wt 77 1 kg (170 lb)   LMP 04/05/2017 (Exact Date)   BMI 32 12 kg/m²   [id number]    Gen: AaOx3, NAD, pleasant  Pulm: No increased work of breathing  Abd: Gravid, nontender, nondistended  Extremities: No edema, nontender, Negative Samantha's bilaterally    Presentation: Vertex/Vertex confirmed via ultrasound    SVE: 3 / 0% / -5  FHT:  Baby A 150 / Moderate 6 - 25 bpm / +accels, -decels            Baby B 155 / Moderate 6 - 25 bpm / +accels, -decels  Genola: Rare    Membranes: Intact    Assessment:   29 y o  [id number] at 36w1d weeks, here for scheduled induction of labor for mono-di twin gestation    Plan:   1  Admit to L&D  2  CBC, RPR, type and screen  3  Pitocin for cervical ripening, will transition to labor suite for pitocin titration  4  Epidural upon request    Discussed with Dr José Naik,   OB/GYN, PGY2  1/13/2018, 8:18 PM

## 2018-01-13 NOTE — PLAN OF CARE
INFECTION - ADULT     Absence or prevention of progression during hospitalization Progressing        Knowledge Deficit     Patient/family/caregiver demonstrates understanding of disease process, treatment plan, medications, and discharge instructions Progressing     Verbalizes understanding of labor plan Progressing        Labor & Delivery     Manages discomfort Progressing     Patient vital signs are stable Progressing        PAIN - ADULT     Verbalizes/displays adequate comfort level or baseline comfort level Progressing        SAFETY ADULT     Patient will remain free of falls Progressing     Maintain or return to baseline ADL function Progressing     Maintain or return mobility status to optimal level Progressing

## 2018-01-14 ENCOUNTER — ANESTHESIA EVENT (INPATIENT)
Dept: LABOR AND DELIVERY | Facility: HOSPITAL | Age: 28
DRG: 540 | End: 2018-01-14
Payer: COMMERCIAL

## 2018-01-14 VITALS
WEIGHT: 149.4 LBS | DIASTOLIC BLOOD PRESSURE: 79 MMHG | SYSTOLIC BLOOD PRESSURE: 131 MMHG | HEIGHT: 62 IN | BODY MASS INDEX: 27.49 KG/M2

## 2018-01-14 VITALS
SYSTOLIC BLOOD PRESSURE: 124 MMHG | BODY MASS INDEX: 28.89 KG/M2 | DIASTOLIC BLOOD PRESSURE: 75 MMHG | WEIGHT: 157 LBS | HEIGHT: 62 IN

## 2018-01-14 PROBLEM — Z98.891 S/P CESAREAN SECTION: Status: ACTIVE | Noted: 2018-01-14

## 2018-01-14 LAB
BASE EXCESS BLDCOA CALC-SCNC: -3.8 MMOL/L (ref 3–11)
BASE EXCESS BLDCOA CALC-SCNC: -5.4 MMOL/L (ref 3–11)
BASE EXCESS BLDCOV CALC-SCNC: -4.6 MMOL/L (ref 1–9)
BASE EXCESS BLDCOV CALC-SCNC: -4.9 MMOL/L (ref 1–9)
HCO3 BLDCOA-SCNC: 21 MMOL/L (ref 17.3–27.3)
HCO3 BLDCOA-SCNC: 22 MMOL/L (ref 17.3–27.3)
HCO3 BLDCOV-SCNC: 20 MMOL/L (ref 12.2–28.6)
HCO3 BLDCOV-SCNC: 21.1 MMOL/L (ref 12.2–28.6)
O2 CT VFR BLDCOA CALC: 5.2 ML/DL
O2 CT VFR BLDCOA CALC: 9.1 ML/DL
OXYHGB MFR BLDCOA: 27.5 %
OXYHGB MFR BLDCOA: 48 %
OXYHGB MFR BLDCOV: 50.5 %
OXYHGB MFR BLDCOV: 50.5 %
PCO2 BLDCOA: 42.7 MM[HG] (ref 30–60)
PCO2 BLDCOA: 43.9 MM[HG] (ref 30–60)
PCO2 BLDCOV: 36.8 MM HG (ref 27–43)
PCO2 BLDCOV: 41 MM HG (ref 27–43)
PH BLDCOA: 7.3 [PH] (ref 7.23–7.43)
PH BLDCOA: 7.33 [PH] (ref 7.23–7.43)
PH BLDCOV: 7.33 [PH] (ref 7.19–7.49)
PH BLDCOV: 7.35 [PH] (ref 7.19–7.49)
PO2 BLDCOA: 14.2 MM HG (ref 5–25)
PO2 BLDCOA: 20.7 MM HG (ref 5–25)
PO2 BLDCOV: 21.8 MM HG (ref 15–45)
PO2 BLDCOV: 22.5 MM HG (ref 15–45)
SAO2 % BLDCOV: 9.8 ML/DL
SAO2 % BLDCOV: 9.8 ML/DL

## 2018-01-14 PROCEDURE — 3E033VJ INTRODUCTION OF OTHER HORMONE INTO PERIPHERAL VEIN, PERCUTANEOUS APPROACH: ICD-10-PCS | Performed by: OBSTETRICS & GYNECOLOGY

## 2018-01-14 PROCEDURE — 88307 TISSUE EXAM BY PATHOLOGIST: CPT | Performed by: OBSTETRICS & GYNECOLOGY

## 2018-01-14 PROCEDURE — 82805 BLOOD GASES W/O2 SATURATION: CPT | Performed by: OBSTETRICS & GYNECOLOGY

## 2018-01-14 RX ORDER — IBUPROFEN 600 MG/1
600 TABLET ORAL EVERY 6 HOURS PRN
Status: DISCONTINUED | OUTPATIENT
Start: 2018-01-14 | End: 2018-01-17 | Stop reason: HOSPADM

## 2018-01-14 RX ORDER — ONDANSETRON 2 MG/ML
4 INJECTION INTRAMUSCULAR; INTRAVENOUS EVERY 4 HOURS PRN
Status: DISCONTINUED | OUTPATIENT
Start: 2018-01-14 | End: 2018-01-14 | Stop reason: SDUPTHER

## 2018-01-14 RX ORDER — BUPIVACAINE HYDROCHLORIDE 7.5 MG/ML
INJECTION, SOLUTION EPIDURAL; RETROBULBAR AS NEEDED
Status: DISCONTINUED | OUTPATIENT
Start: 2018-01-14 | End: 2018-01-14 | Stop reason: SURG

## 2018-01-14 RX ORDER — SODIUM CHLORIDE, SODIUM LACTATE, POTASSIUM CHLORIDE, CALCIUM CHLORIDE 600; 310; 30; 20 MG/100ML; MG/100ML; MG/100ML; MG/100ML
75 INJECTION, SOLUTION INTRAVENOUS CONTINUOUS
Status: DISCONTINUED | OUTPATIENT
Start: 2018-01-14 | End: 2018-01-14 | Stop reason: SDUPTHER

## 2018-01-14 RX ORDER — OXYCODONE HYDROCHLORIDE AND ACETAMINOPHEN 5; 325 MG/1; MG/1
2 TABLET ORAL EVERY 4 HOURS PRN
Status: DISCONTINUED | OUTPATIENT
Start: 2018-01-15 | End: 2018-01-17 | Stop reason: HOSPADM

## 2018-01-14 RX ORDER — EPHEDRINE SULFATE 50 MG/ML
INJECTION, SOLUTION INTRAVENOUS AS NEEDED
Status: DISCONTINUED | OUTPATIENT
Start: 2018-01-14 | End: 2018-01-14 | Stop reason: SURG

## 2018-01-14 RX ORDER — DIPHENHYDRAMINE HYDROCHLORIDE 50 MG/ML
25 INJECTION INTRAMUSCULAR; INTRAVENOUS EVERY 6 HOURS PRN
Status: DISCONTINUED | OUTPATIENT
Start: 2018-01-14 | End: 2018-01-14 | Stop reason: SDUPTHER

## 2018-01-14 RX ORDER — CARBOPROST TROMETHAMINE 250 UG/ML
INJECTION, SOLUTION INTRAMUSCULAR
Status: DISCONTINUED
Start: 2018-01-14 | End: 2018-01-14 | Stop reason: WASHOUT

## 2018-01-14 RX ORDER — CLINDAMYCIN PHOSPHATE 900 MG/50ML
900 INJECTION INTRAVENOUS ONCE
Status: DISCONTINUED | OUTPATIENT
Start: 2018-01-14 | End: 2018-01-14

## 2018-01-14 RX ORDER — METOCLOPRAMIDE HYDROCHLORIDE 5 MG/ML
INJECTION INTRAMUSCULAR; INTRAVENOUS AS NEEDED
Status: DISCONTINUED | OUTPATIENT
Start: 2018-01-14 | End: 2018-01-14 | Stop reason: SURG

## 2018-01-14 RX ORDER — DIPHENHYDRAMINE HYDROCHLORIDE 50 MG/ML
INJECTION INTRAMUSCULAR; INTRAVENOUS AS NEEDED
Status: DISCONTINUED | OUTPATIENT
Start: 2018-01-14 | End: 2018-01-14 | Stop reason: SURG

## 2018-01-14 RX ORDER — ACETAMINOPHEN 325 MG/1
650 TABLET ORAL EVERY 6 HOURS PRN
Status: DISCONTINUED | OUTPATIENT
Start: 2018-01-14 | End: 2018-01-17 | Stop reason: HOSPADM

## 2018-01-14 RX ORDER — DOCUSATE SODIUM 100 MG/1
100 CAPSULE, LIQUID FILLED ORAL 2 TIMES DAILY
Status: DISCONTINUED | OUTPATIENT
Start: 2018-01-14 | End: 2018-01-17 | Stop reason: HOSPADM

## 2018-01-14 RX ORDER — SODIUM CHLORIDE, SODIUM LACTATE, POTASSIUM CHLORIDE, CALCIUM CHLORIDE 600; 310; 30; 20 MG/100ML; MG/100ML; MG/100ML; MG/100ML
125 INJECTION, SOLUTION INTRAVENOUS CONTINUOUS
Status: DISCONTINUED | OUTPATIENT
Start: 2018-01-14 | End: 2018-01-17 | Stop reason: HOSPADM

## 2018-01-14 RX ORDER — METHYLERGONOVINE MALEATE 0.2 MG/ML
INJECTION INTRAVENOUS
Status: DISCONTINUED
Start: 2018-01-14 | End: 2018-01-14 | Stop reason: WASHOUT

## 2018-01-14 RX ORDER — OXYTOCIN/RINGER'S LACTATE 30/500 ML
62.5 PLASTIC BAG, INJECTION (ML) INTRAVENOUS
Status: ACTIVE | OUTPATIENT
Start: 2018-01-14 | End: 2018-01-15

## 2018-01-14 RX ORDER — KETOROLAC TROMETHAMINE 30 MG/ML
30 INJECTION, SOLUTION INTRAMUSCULAR; INTRAVENOUS EVERY 6 HOURS
Status: DISPENSED | OUTPATIENT
Start: 2018-01-14 | End: 2018-01-15

## 2018-01-14 RX ORDER — DIPHENHYDRAMINE HCL 25 MG
25 TABLET ORAL EVERY 6 HOURS PRN
Status: DISCONTINUED | OUTPATIENT
Start: 2018-01-14 | End: 2018-01-17 | Stop reason: HOSPADM

## 2018-01-14 RX ORDER — MORPHINE SULFATE 1 MG/ML
INJECTION, SOLUTION EPIDURAL; INTRATHECAL; INTRAVENOUS AS NEEDED
Status: DISCONTINUED | OUTPATIENT
Start: 2018-01-14 | End: 2018-01-14 | Stop reason: SURG

## 2018-01-14 RX ORDER — ONDANSETRON 2 MG/ML
INJECTION INTRAMUSCULAR; INTRAVENOUS AS NEEDED
Status: DISCONTINUED | OUTPATIENT
Start: 2018-01-14 | End: 2018-01-14 | Stop reason: SURG

## 2018-01-14 RX ORDER — FENTANYL CITRATE 50 UG/ML
INJECTION, SOLUTION INTRAMUSCULAR; INTRAVENOUS AS NEEDED
Status: DISCONTINUED | OUTPATIENT
Start: 2018-01-14 | End: 2018-01-14 | Stop reason: SURG

## 2018-01-14 RX ORDER — SIMETHICONE 80 MG
80 TABLET,CHEWABLE ORAL 4 TIMES DAILY PRN
Status: DISCONTINUED | OUTPATIENT
Start: 2018-01-14 | End: 2018-01-17 | Stop reason: HOSPADM

## 2018-01-14 RX ORDER — KETOROLAC TROMETHAMINE 30 MG/ML
INJECTION, SOLUTION INTRAMUSCULAR; INTRAVENOUS AS NEEDED
Status: DISCONTINUED | OUTPATIENT
Start: 2018-01-14 | End: 2018-01-14 | Stop reason: SURG

## 2018-01-14 RX ORDER — METOCLOPRAMIDE HYDROCHLORIDE 5 MG/ML
5 INJECTION INTRAMUSCULAR; INTRAVENOUS EVERY 6 HOURS PRN
Status: ACTIVE | OUTPATIENT
Start: 2018-01-14 | End: 2018-01-15

## 2018-01-14 RX ORDER — OXYCODONE HYDROCHLORIDE AND ACETAMINOPHEN 5; 325 MG/1; MG/1
1 TABLET ORAL EVERY 4 HOURS PRN
Status: DISCONTINUED | OUTPATIENT
Start: 2018-01-15 | End: 2018-01-17 | Stop reason: HOSPADM

## 2018-01-14 RX ORDER — ONDANSETRON 2 MG/ML
4 INJECTION INTRAMUSCULAR; INTRAVENOUS EVERY 8 HOURS PRN
Status: DISCONTINUED | OUTPATIENT
Start: 2018-01-14 | End: 2018-01-17 | Stop reason: HOSPADM

## 2018-01-14 RX ORDER — NALBUPHINE HCL 10 MG/ML
10 AMPUL (ML) INJECTION
Status: DISCONTINUED | OUTPATIENT
Start: 2018-01-14 | End: 2018-01-17 | Stop reason: HOSPADM

## 2018-01-14 RX ORDER — DEXAMETHASONE SODIUM PHOSPHATE 4 MG/ML
8 INJECTION, SOLUTION INTRA-ARTICULAR; INTRALESIONAL; INTRAMUSCULAR; INTRAVENOUS; SOFT TISSUE ONCE AS NEEDED
Status: ACTIVE | OUTPATIENT
Start: 2018-01-14 | End: 2018-01-15

## 2018-01-14 RX ADMIN — KETOROLAC TROMETHAMINE 30 MG: 30 INJECTION, SOLUTION INTRAMUSCULAR at 14:37

## 2018-01-14 RX ADMIN — EPHEDRINE SULFATE 25 MG: 50 INJECTION, SOLUTION INTRAMUSCULAR; INTRAVENOUS; SUBCUTANEOUS at 14:02

## 2018-01-14 RX ADMIN — EPHEDRINE SULFATE 10 MG: 50 INJECTION, SOLUTION INTRAMUSCULAR; INTRAVENOUS; SUBCUTANEOUS at 14:19

## 2018-01-14 RX ADMIN — ONDANSETRON 4 MG: 2 INJECTION INTRAMUSCULAR; INTRAVENOUS at 14:02

## 2018-01-14 RX ADMIN — SODIUM CHLORIDE, SODIUM LACTATE, POTASSIUM CHLORIDE, AND CALCIUM CHLORIDE 75 ML/HR: .6; .31; .03; .02 INJECTION, SOLUTION INTRAVENOUS at 16:35

## 2018-01-14 RX ADMIN — KETOROLAC TROMETHAMINE 30 MG: 30 INJECTION, SOLUTION INTRAMUSCULAR at 23:00

## 2018-01-14 RX ADMIN — FENTANYL CITRATE 15 MCG: 50 INJECTION, SOLUTION INTRAMUSCULAR; INTRAVENOUS at 13:27

## 2018-01-14 RX ADMIN — METOCLOPRAMIDE 10 MG: 5 INJECTION, SOLUTION INTRAMUSCULAR; INTRAVENOUS at 14:02

## 2018-01-14 RX ADMIN — BUPIVACAINE HYDROCHLORIDE 1.6 ML: 7.5 INJECTION, SOLUTION EPIDURAL; RETROBULBAR at 13:27

## 2018-01-14 RX ADMIN — PHENYLEPHRINE HYDROCHLORIDE 50 MCG/MIN: 10 INJECTION INTRAVENOUS at 13:27

## 2018-01-14 RX ADMIN — SODIUM CHLORIDE, SODIUM LACTATE, POTASSIUM CHLORIDE, AND CALCIUM CHLORIDE: .6; .31; .03; .02 INJECTION, SOLUTION INTRAVENOUS at 13:54

## 2018-01-14 RX ADMIN — SODIUM CHLORIDE, SODIUM LACTATE, POTASSIUM CHLORIDE, AND CALCIUM CHLORIDE 125 ML/HR: .6; .31; .03; .02 INJECTION, SOLUTION INTRAVENOUS at 08:13

## 2018-01-14 RX ADMIN — MORPHINE SULFATE 0.15 MG: 1 INJECTION, SOLUTION EPIDURAL; INTRATHECAL; INTRAVENOUS at 13:27

## 2018-01-14 RX ADMIN — CEFAZOLIN SODIUM 2000 MG: 2 SOLUTION INTRAVENOUS at 13:40

## 2018-01-14 RX ADMIN — SODIUM CHLORIDE, SODIUM LACTATE, POTASSIUM CHLORIDE, AND CALCIUM CHLORIDE 125 ML/HR: .6; .31; .03; .02 INJECTION, SOLUTION INTRAVENOUS at 00:07

## 2018-01-14 RX ADMIN — AZITHROMYCIN MONOHYDRATE 500 MG: 500 INJECTION, POWDER, LYOPHILIZED, FOR SOLUTION INTRAVENOUS at 13:40

## 2018-01-14 RX ADMIN — DIPHENHYDRAMINE HYDROCHLORIDE 25 MG: 50 INJECTION, SOLUTION INTRAMUSCULAR; INTRAVENOUS at 14:11

## 2018-01-14 RX ADMIN — DIPHENHYDRAMINE HCL 25 MG: 25 TABLET ORAL at 21:25

## 2018-01-14 RX ADMIN — OXYTOCIN 250 MILLI-UNITS/MIN: 10 INJECTION, SOLUTION INTRAMUSCULAR; INTRAVENOUS at 13:57

## 2018-01-14 NOTE — OB LABOR/OXYTOCIN SAFETY PROGRESS
Labor Progress Note - Parag Willson 29 y o  female MRN: [id number]    Unit/Bed#: -01 Encounter: [id number]    Obstetric History       T2      L2     SAB2   TAB0   Ectopic0   Multiple0   Live Births2      Gestational Age: 37w1d  Dose (fer-units/min) Oxytocin: 20 fer-units/min  Contraction Frequency (minutes): 3-3 5  Contraction Quality: Moderate  Tachysystole: No   Dilation: 4        Effacement (%): 50  Station: -3  Baseline Rate: 155 bpm (difficult trace)  Fetal Heart Rate: 153 BPM  FHR Category: Category I     Oxytocin Safety Progress Check: Safety check completed    Notes/comments:   Pitocin at 20  SVE 4/50/-3  Cat I FHT x 2  Titrate pitocin as tolerated  Recheck in 2 hours, sooner if indicated    Discussed benefit of placing epidural catheter in the event that she were to go for a  section, patient declines at this time but will continue to consider          Jovani Alvarez MD 2018 10:28 AM

## 2018-01-14 NOTE — DISCHARGE INSTRUCTIONS
Section   WHAT YOU SHOULD KNOW:   A  delivery, or , is abdominal surgery to deliver your baby  There are many reasons you may need a   · A  may be scheduled before labor if you had a  with your last baby  It may be scheduled if your baby is not positioned normally, or you are pregnant with more than 1 baby  · Your caregiver may perform an emergency  during labor to prevent life-threatening complications for you or your baby  A  may be done if your cervix does not dilate after several hours of active labor  · Other reasons for a  include maternal infections and problems with the placenta  AFTER YOU LEAVE:   Medicines:   · Prescription pain medicine  may be given  Ask how to take this medicine safely  · Acetaminophen  decreases pain and fever  It is available without a doctor's order  Ask how much to take and how often to take it  Follow directions  Acetaminophen can cause liver damage if not taken correctly  · NSAIDs  help decrease swelling and pain or fever  This medicine is available with or without a doctor's order  NSAIDs can cause stomach bleeding or kidney problems in certain people  If you take blood thinner medicine, always ask your obstetrician if NSAIDs are safe for you  Always read the medicine label and follow directions  · Take your medicine as directed  Contact your obstetrician (OB) if you think your medicine is not helping or if you have side effects  Tell him if you are allergic to any medicine  Keep a list of the medicines, vitamins, and herbs you take  Include the amounts, and when and why you take them  Bring the list or the pill bottles to follow-up visits  Carry your medicine list with you in case of an emergency  Follow up with your OB as directed: You may need to return to have your stitches or staples removed   Write down your questions so you remember to ask them during your visits  Wound care:  Carefully wash your wound with soap and water every day  Keep your wound clean and dry  Wear loose, comfortable clothes that do not rub against your wound  Ask your OB about bathing and showering  Drink plenty of liquids: You can lower your risk for a blood clot if you drink plenty of liquids  Ask how much liquid to drink each day and which liquids are best for you  Limit activity until you have fully recovered from surgery:   · Ask when it is safe for you to drive, walk up stairs, lift heavy objects, and have sex  · Ask when it is okay to exercise, and what types of exercise to do  Start slowly and do more as you get stronger  Contact your OB if:   · You have heavy vaginal bleeding that fills 1 or more sanitary pads in 1 hour  · You have a fever  · Your incision is swollen, red, or draining pus  · You have questions or concerns about yourself or your baby  Seek care immediately or call 911 if:   · Blood soaks through your bandage  · Your stitches come apart  · You feel lightheaded, short of breath, and have chest pain  · You cough up blood  · Your arm or leg feels warm, tender, and painful  It may look swollen and red  [address] is for End User's use only and may not be sold, redistributed or otherwise used for commercial purposes  All illustrations and images included in CareNotes® are the copyrighted property of [employer]  or [facility]  The above information is an  only  It is not intended as medical advice for individual conditions or treatments  Talk to your doctor, nurse or pharmacist before following any medical regimen to see if it is safe and effective for you  Postpartum Bleeding   WHAT YOU NEED TO KNOW:   Postpartum bleeding is vaginal bleeding after childbirth  This bleeding is normal, whether your baby was born vaginally or by    It contains blood and the tissue that lined the inside of your uterus when you were pregnant  DISCHARGE INSTRUCTIONS:   What to expect with postpartum bleeding:  Postpartum bleeding usually lasts at least 10 days, and may last longer than 6 weeks  Your bleeding may range from light (barely staining a pad) to heavy (soaking a pad in 1 hour)  Usually, you have heavier bleeding right after childbirth, which slows over the next few weeks until it stops  The bleeding is red or dark brown with clots for the first 1 to 3 days  It then turns pink for several days, and then becomes a white or yellow discharge until it ends  Follow up with your obstetrician as directed:  Do not have sex until your obstetrician says it is okay  Write down your questions so you remember to ask them during your visits  Contact your healthcare provider or obstetrician if:   · Your bleeding increases, or you have heavy bleeding that soaks a pad in 1 hour for 2 hours in a row  · You pass large blood clots  · You are breathing faster than normal, or your heart is beating faster than normal     · You are urinating less than usual, or not at all  · You feel dizzy  · You have questions or concerns about your condition or care  Seek immediate care or call 911 if:   · You are suddenly short of breath and feel lightheaded  · You have sudden chest pain  © 2017 [address]  Information is for End User's use only and may not be sold, redistributed or otherwise used for commercial purposes  All illustrations and images included in CareNotes® are the copyrighted property of [employer]  or [employer] Snow  The above information is an  only  It is not intended as medical advice for individual conditions or treatments  Talk to your doctor, nurse or pharmacist before following any medical regimen to see if it is safe and effective for you        Breast Care for the Breast Feeding Mother   WHAT YOU SHOULD KNOW:   Your breasts will go through normal changes while you are breastfeeding  Sometimes breast and nipple problems can develop while you are breastfeeding  Learn about changes that are normal and those that may be a problem  Breast care can help you prevent and manage problems so you and your baby can enjoy the benefits of breastfeeding  AFTER YOU LEAVE:   Breast changes while you are breastfeeding:   · For the first few days after your baby is born, your body makes a small amount of breast milk (colostrum)  Within about 2 to 5 days, your body will begin making mature milk  It may take up to 10 days or longer for mature milk to come in  When your mature milk comes in, your breasts will become full and firm  They may feel tender  · Breastfeeding your baby will decrease the full feeling in your breasts  You may feel a tingly sensation during feedings as milk is released from your breasts  This is called the milk let-down reflex  After 7 or more days, the fullness may feel like it has decreased  Your nipples should look the same as they did before you started breastfeeding  Breasts that feel full before and empty after breastfeeding are signs that breastfeeding is going well  Breast problems that can occur while you are breastfeeding:   · Nipple soreness  may occur when you begin to breastfeed your baby  You may also have nipple soreness if your baby is not latched on to your breast correctly  Correct positioning and latch-on may decrease or stop the pain in your nipples  Work with your caregivers to help your baby latch on correctly  It may also be helpful to place warm, wet compresses on your nipples to help decrease pain  · Plugged milk ducts  may cause painful breast lumps  Plugged ducts may be caused by not emptying your breasts completely during feedings  When your baby pauses during breastfeeding, massage and gently squeeze your breast  Gentle massage may unplug a blocked milk duct   Pump out any milk left in your breasts after your baby is done breastfeeding  Avoid wearing tight tops, tight bras, or under-wire bras, because they may put pressure on your breasts  · Engorgement  may occur as your milk comes in soon after you begin breastfeeding  Engorgement may cause your breasts to become swollen and painful  Your breasts may also become engorged if you miss a feeding or you do not breastfeed on demand  The best way to decrease engorgement symptoms is to empty your breasts by feeding your baby often  Engorgement can make it hard for your baby to latch on to your breast  If this happens, express a small amount of milk and then have your baby latch on  Cold compresses, gel packs, or ice packs on your breasts can help decrease pain and swelling  Ask your caregiver how often and how long you should use cold, or ice packs  · A breast infection called mastitis  can develop if you have plugged milk ducts or engorgement  Mastitis causes your breasts to become red, swollen, and painful  You may also have flu-like symptoms, such as chills and a fever  Place heat on your breasts to help decrease the pain  You may want to place a moist, warm cloth on the painful breast or both of your breasts  Ask how often to do this  Your primary healthcare provider [facility]) may suggest that you take an NSAID, such as ibuprofen, to decrease pain and swelling  He may also order antibiotics to treat mastitis  Ask about feeding your baby when you have a breast infection  How to help prevent or manage breast problems while you are breastfeeding:   · Learn how to position your baby and latch him on correctly  To latch your baby correctly to your breast, make sure that his mouth covers most of your areola (dark area around your nipple)  He should not be attached only to the nipple  Your baby is latched on well if you feel comfortable and do not feel pain   A correct latch helps him get enough milk and can help to prevent sore nipples and other breast problems  There are several breastfeeding positions that you can try  Find the position that works best for you and your baby  Ask your caregiver for more information about how to hold and breastfeed your baby  · Prevent biting  Your baby may get teeth at about 1to 3months of age  To help prevent biting, break his suction once he is finished or if he has fallen asleep  To break his suction, slip a finger into the side of his mouth  If your baby bites you, respond with surprise or unhappiness  Offer praise when he does not bite you  · Breastfeed your baby regularly  Feed your baby 8 to 12 times a day  You may need to wake up your baby at night to feed him  It is okay to feed from 1 or both breasts at each feeding  Your baby should breastfeed from both breasts equally over the course of a day  If your baby only feeds from 1 side during a feeding, offer your other breast to him first for the next feeding  · Schedule and keep follow-up visits  Talk to your baby's pediatrician or your PHP during follow-up visits if you have breast problems  Caregivers may suggest that you, or you and your partner, attend classes on breastfeeding  You also may want to join a breastfeeding support group  Caregivers may suggest that you see a lactation consultant  This is a caregiver who can help you with breastfeeding  Contact your PHP if:   · You have a fever and chills  · You have body aches and you feel like you do not have any energy  · One or both of your breasts is red, swollen or hard, painful, and feels warm or hot  · You have breast engorgement that does not get better within 24 hours  · You see or feel a lump in your breast that hurts when you touch it  · You have nipple pain during breastfeeding or between feedings  · Your nipples are red, dry, cracked, or bleeding, or they have scabs on them  · You have questions or concerns about your condition or care    © 2014 [employer] [address] [facility] is for End User's use only and may not be sold, redistributed or otherwise used for commercial purposes  All illustrations and images included in CareNotes® are the copyrighted property of [employer] M [employer] Inc  or Jayson Snow  The above information is an  only  It is not intended as medical advice for individual conditions or treatments  Talk to your doctor, nurse or pharmacist before following any medical regimen to see if it is safe and effective for you

## 2018-01-14 NOTE — OB LABOR/OXYTOCIN SAFETY PROGRESS
Oxytocin Safety Progress Check Note - Della Goodrich 29 y o  female MRN: [id number]    Unit/Bed#: -01 Encounter: [id number]    Obstetric History       T2      L2     SAB2   TAB0   Ectopic0   Multiple0   Live Births2      Gestational Age: 36w1d  Dose (fer-units/min) Oxytocin: 4 fer-units/min  Contraction Frequency (minutes): 7-8  Contraction Quality: Mild  Tachysystole: No   Dilation: 3        Effacement (%): 0  Station: Floating  Baseline Rate: 150 bpm  Fetal Heart Rate: 145 BPM  FHR Category: Category I          Notes/comments:   Pt reports she is currently "mostly comfortable"  No change from previous examination  FHT Cat I x2, cont to closely monitor  Recheck in 2hr, sooner if uncomfortable  Will start pitocin titration    Discussed with Dr Fracisco Delgado DO 2018 11:33 PM

## 2018-01-14 NOTE — PLAN OF CARE
Labor & Delivery     Manages discomfort Completed     Patient vital signs are stable Completed          DISCHARGE PLANNING     Discharge to home or other facility with appropriate resources Progressing        INFECTION - ADULT     Absence or prevention of progression during hospitalization Progressing     Absence of fever/infection during neutropenic period Progressing        Knowledge Deficit     Patient/family/caregiver demonstrates understanding of disease process, treatment plan, medications, and discharge instructions Progressing     Verbalizes understanding of labor plan Progressing        PAIN - ADULT     Verbalizes/displays adequate comfort level or baseline comfort level Progressing        SAFETY ADULT     Patient will remain free of falls Progressing     Maintain or return to baseline ADL function Progressing     Maintain or return mobility status to optimal level Progressing

## 2018-01-14 NOTE — ANESTHESIA PREPROCEDURE EVALUATION
Review of Systems/Medical History  Patient summary reviewed  Chart reviewed      Cardiovascular  Negative cardio ROS    Pulmonary  Negative pulmonary ROS ,        GI/Hepatic  Negative GI/hepatic ROS          Negative  ROS        Endo/Other  Negative endo/other ROS      GYN  Currently pregnant , Prior pregnancy/OB history : 5 Parity: 2,     Comment: Hx of  x2  Currently pregnant with mono-di twins, had IOL and requested elective  delivery     Hematology  Negative hematology ROS      Musculoskeletal  Obesity ,        Neurology  Negative neurology ROS      Psychology   Negative psychology ROS            Physical Exam    Airway    Mallampati score: II  TM Distance: >3 FB  Neck ROM: full     Dental   No notable dental hx     Cardiovascular  Comment: Negative ROS, Rhythm: regular, Rate: normal, Cardiovascular exam normal    Pulmonary  Pulmonary exam normal Breath sounds clear to auscultation,     Other Findings        Anesthesia Plan  ASA Score- 2     Anesthesia Type- spinal with ASA Monitors  Additional Monitors:   Airway Plan:         Plan Factors-    Induction-     Postoperative Plan-     Informed Consent- Anesthetic plan and risks discussed with patient  Recent labs personally reviewed:  Lab Results   Component Value Date    WBC 8 76 2018    HGB 8 9 (L) 2018     2018     No results found for: NA, K, BUN, CREATININE, GLUCOSE  No results found for: PTT   No results found for: INR    Blood type A    No results found for: Cnoy Hilario MD, have personally seen and evaluated the patient prior to anesthetic care  I have reviewed the pre-anesthetic record, and other medical records if appropriate to the anesthetic care  If a CRNA is involved in the case, I have reviewed the CRNA assessment, if present, and agree   Risks/benefits and alternatives discussed with patient including possible PONV, sore throat, and possibility of rare anesthetic and surgical emergencies

## 2018-01-14 NOTE — OB LABOR/OXYTOCIN SAFETY PROGRESS
Oxytocin Safety Progress Check Note - Madhuri Jimenes 29 y o  female MRN: [id number]    Unit/Bed#: -01 Encounter: [id number]    Obstetric History       T2      L2     SAB2   TAB0   Ectopic0   Multiple0   Live Births2      Gestational Age: 37w1d  Dose (fer-units/min) Oxytocin: 8 fer-units/min  Contraction Frequency (minutes): 2-5  Contraction Quality: Mild  Tachysystole: No   Dilation: 3        Effacement (%): 0  Station: Floating  Baseline Rate:  (pt laying on the side, difficult trace)  Fetal Heart Rate: 145 BPM  FHR Category: Category I          Notes/comments:   Pt reports that contractions "feel like they're getting stronger", but appears comfortable  No wincing or breathing through contractions    Cervical exam deferred  FHT Cat 1 x2, cont to closely monitor  Continue pitocin titration  Recheck in 1-2hr, sooner if uncomfortable          Nazia Orta DO 2018 1:41 AM

## 2018-01-14 NOTE — DISCHARGE SUMMARY
Discharge Summary - OB/GYN   Herlinda Staley 29 y o  female MRN: [id number]  Unit/Bed#: LD OR 2- Encounter: [id number]      Admission Date: 2018     Discharge Date: 2018    Admitting Diagnosis:   1  Pregnancy at 36w2d  2  Mono/Di TIUP  3  Anemia    Discharge Diagnosis:   Same, delivered    Procedures: primary  section, low transverse incision    Attending: Brian Saul MD    Hospital Course:     Herlinda Staley is a 29 y o  [id number] at 36w2d wks who was initially admitted for scheduled induction of labor for Mono-Di TIUP VTX/VTX  The patient was induced with Pitocin  During induction the patient electively chose to have a primary  section  She delivered a viable male  on 18 at 26  Weight 4lbs 9oz via primary  section, low transverse incision  Apgars were 8 (1 min) and 9 (5 min)  She delivered a viable male  on 18 at [phone]  Weight 4lbs 11oz via primary  section, low transverse incision  Apgars were 8 (1 min) and 9 (5 min)  Neonates were transferred to the NICU  Patient tolerated the procedure well and was transferred to recovery in stable condition  Preoperative hemaglobin was 8 9, postoperative was 6 8  Her postoperative pain was well controlled with oral analgesics  On day of discharge, she was ambulating and able to reasonably perform all ADLs  She was voiding and had appropriate bowel function  Pain was well controlled  She was discharged home on post-operative day #3 without complications  Patient was instructed to follow up with her OB as an outpatient and was given appropriate warnings to call provider if she develops signs of infection or uncontrolled pain  Complications: none apparent    Condition at discharge: stable     Discharge instructions/Information to patient and family:   See after visit summary for information provided to patient and family        Provisions for Follow-Up Care:  See after visit summary for information related to follow-up care and any pertinent home health orders  Disposition: Home    Planned Readmission: No    Discharge Medications: For a complete list of the patient's medications, please refer to her med rec

## 2018-01-14 NOTE — OP NOTE
Operative Report - OB/GYN   Theo Marcelo 29 y o  female MRN: [id number]  Unit/Bed#:  OR  Encounter: [id number]    Indications: Mono/Di TIUP ; elective maternal request    Pre-operative Diagnosis:   1  36 week 2 day pregnancy  2  Mono/Di TIUP  3  Desire for permanent tubal sterilization    Post-operative Diagnosis: same, delivered; declined tubal sterilization intraoperatively    Surgeon: Adwoa Henderson MD    Assistant(s): Yash Francisco MD    Findings:  1  Delivery of viable male on 18 at 1355, weight 4lbs 9oz;  Apgar scores of 8 at one minute and 9 at five minutes  2  Delivery of viable male on 18 at 1357, weight 4lbs 11oz; Apgar scores of 8 at one minute and 9 at five minutes  3  Normal appearing placenta with centrally-inserted 3 vessel cords  4  Clear amniotic fluid  5  Grossly normal uterus, tubes, and ovaries    Specimens:   1  Arterial and venous cord gases x 2  2  Cord blood x 2  3  Segment of umbilical cord x 2  4  Placenta to storage     Estimated Blood Loss: 800 mL           Total IV Fluids: 1800mL    Drains: Samson catheter           Complications:  None; patient tolerated the procedure well  Disposition: PACU            Condition: stable    Operative Technique:     Decision was made to proceed with  section due to Mono/Di TIUP and maternal request  Risks, benefits, possible complications, alternate treatment options, and expected outcomes were discussed with the patient  The patient agreed with the proposed plan and gave informed consent  The patient was taken to the operating room where she was properly identified to the OR staff and attending physician  She received spinal anesthesia in the operating room by Dr Keli Jaimes  Fetal heart tones x 2 were appreciated and found to be appropriate  The vagina was prepped with Betadine and a Samson catheter was aseptically inserted  SCDs were placed bilaterally and turned on    The abdomen was prepped with Chloraprep and following appropriate drying time, the patient was draped in the usual sterile manner for a Pfannenstiel incision  The patient had received Ancef 2g IV and Azithromycin 500mg IV pre-operatively for prophylaxis  A Time Out was held and the above information confirmed  The patient was identified as Ruben Manzo and the procedure verified as  Delivery and bilateral tubal ligation  A Pfannenstiel incision was made and carried down through the underlying subcutaneous tissue to the fascia using a scalpel  Rectus fascia was then incised in the midline  We then proceeded in Ruiz-Bertrand fashion  All anatomic layers were well-demarcated  The rectus muscles were  and the peritoneum was identified, entered, and extended longitudinally with blunt dissection  The bladder blade was inserted  A low transverse uterine incision was made with the scalpel and extended laterally with blunt dissection  The amnion was entered bluntly  Surgeon's hand was inserted through the hysterotomy and the presenting part of Baby #1 was noted to be vertex  The fetal head was elevated, and delivered through the uterine incision with the assistance of fundal pressure  Baby had spontaneous cry with good color and tone  The umbilical cord was clamped x2 and cut  The infant was handed off to the  providers  Surgeon's hand was reinserted in the hysterotomy and the presenting part of Baby #2 was noted be vertex  The fetal head was elevated and delivered through the uterine incision with the assistance of fundal pressure  A loose nuchal cord x 1 was noted and easily reduced  Baby had spontaneous cry with good color and tone  The umbilical cord was clamped x2 and cut  The infant was handed off to the  providers  Arterial and venous cord gases, cord blood, and a segment of umbilical cord were obtained x 2  and promptly sent to the lab    Baby #1's cord was double clamped, while Baby #2's was singly clamped  The placenta delivered spontaneously with uterine fundal massage and was noted to have a centrally inserted two 3 vessel cords  This was also sent to the lab for placental pathology  The uterus was exteriorized and a lap sponge was used to clear the cavity of clots and products of conception  The uterine incision was closed with a running locked suture of 0 Vicryl  A second layer of the same suture was used to imbricate the first  A single figure-of-eight suture was used at the midline of the incision for a small amount of bleeding  Good hemostasis was confirmed upon uterine closure  The posterior culdesac was cleared of clot and debris  Prior to proceeding with bilateral tubal ligation, we performed a second time-out procedure to confirm that the patient wished to proceed with bilateral tubal ligation  At that time, the patient expressed hesitation and doubt regarding her desire for tubal ligation  We therefore instructed the patient that this could be done at a later time outpatient after the patient had more time to consider her decision, and the patient agreed that she would like to wait  The uterus was returned to the abdomen  The uterine incision was reexamined and noted to be hemostatic  The paracolic gutters were inspected and cleared of all clots and debris with moist lap sponges  The fascia was closed with a running suture of 0 Vicryl  Subcutaneous tissues were closed with 2-0 plain gut suture  The skin was closed with 4-0 Vicryl in a subcuticular fashion, and Histoacryl was applied to the incision  Sterile dressing was applied  At the conclusion of the procedure, all needle, sponge, and instrument counts were noted to be correct x2  The patient tolerated the procedure well and was transferred to her the recovery room in stable condition  Dr Daysi Fernández was present and participated in all key portions of the case        Baby#1  APGARS 8/9 ; Cord gases:Arterial pH 7 330, Base express -3 8 ; Venous pH 7 353 ; Base excess -4 9    Baby #2  APGARS 8/9 ; Cord gases: Arterial pH 7 297, Base express -5 4 ; Venous pH 7 329 ; Base excess -4 6      Patient Disposition:  PACU     SIGNATURE: Kenroy Vidal MD  DATE: January 14, 2018  TIME: 2:52 PM      I was scrubbed and participated in the entire procedure

## 2018-01-14 NOTE — ANESTHESIA PROCEDURE NOTES
Spinal Block    Patient location during procedure: OR  Start time: 1/14/2018 1:27 PM  Reason for block: procedure for pain, at surgeon's request and primary anesthetic  Staffing  Anesthesiologist: Maye ROBIN [address]  Performed: anesthesiologist   Preanesthetic Checklist  Completed: patient identified, site marked, surgical consent, pre-op evaluation, timeout performed, IV checked, risks and benefits discussed and monitors and equipment checked  Spinal Block  Patient position: sitting  Prep: ChloraPrep  Patient monitoring: cardiac monitor and frequent blood pressure checks  Approach: midline  Location: L3-4  Injection technique: single-shot  Needle  Needle type: pencil-tip   Needle gauge: 25 G  Needle length: 10 cm  Assessment  Sensory level: [id number] Assessment:  negative aspiration for heme, no paresthesia on injection and positive aspiration for clear CSF    Post-procedure:  adhesive bandage applied, pressure dressing applied, secured with tape, site cleaned and sterile dressing applied

## 2018-01-14 NOTE — OB LABOR/OXYTOCIN SAFETY PROGRESS
Oxytocin Safety Progress Check Note - Joe Ruiz 29 y o  female MRN: [id number]    Unit/Bed#: -01 Encounter: [id number]    Obstetric History       T2      L2     SAB2   TAB0   Ectopic0   Multiple0   Live Births2      Gestational Age: 37w1d  Dose (fer-units/min) Oxytocin: 16 fer-units/min  Contraction Frequency (minutes): 2-3  Contraction Quality: Mild  Tachysystole: No   Dilation: 3-4        Effacement (%): 40  Station: Ballotable  Baseline Rate: 140 bpm  Fetal Heart Rate: 140 BPM  FHR Category: Category I (Simultaneous filing  User may not have seen previous data )          Notes/comments:   Pt comfortable, sleeping  Cervical exam deferred  FHT Cat 1 x2, may be tracing the same fetus   Will readjust  Cont to closely monitor  Cont pitocin titration  Recheck in 2hr, sooner if uncomfortable          Araceli Hernandes DO 2018 5:49 AM

## 2018-01-14 NOTE — OB LABOR/OXYTOCIN SAFETY PROGRESS
Oxytocin Safety Progress Check Note - Ezekiel Pathak 29 y o  female MRN: [id number]    Unit/Bed#: -01 Encounter: [id number]    Obstetric History       T2      L2     SAB2   TAB0   Ectopic0   Multiple0   Live Births2      Gestational Age: 37w1d  Dose (fer-units/min) Oxytocin: 14 fer-units/min  Contraction Frequency (minutes): 2-4  Contraction Quality: Mild  Tachysystole: No   Dilation: 3-4        Effacement (%): 40  Station: Ballotable  Baseline Rate: 135 bpm  Fetal Heart Rate: 155 BPM  FHR Category: Category I          Notes/comments:   Pt appears comfortable  Making small amounts of cervical change  FHT Cat I x2, cont to monitor  Continue pitocin titration  Recheck in 2hr, sooner if uncomfortable    Discussed with Dr Raghu Grider DO 2018 3:48 AM

## 2018-01-14 NOTE — OB LABOR/OXYTOCIN SAFETY PROGRESS
Oxytocin Safety Progress Check Note - Fiordaliza Foss 29 y o  female MRN: [id number]    Unit/Bed#: -01 Encounter: [id number]    Obstetric History       T2      L2     SAB2   TAB0   Ectopic0   Multiple0   Live Births2      Gestational Age: 37w1d  Dose (fer-units/min) Oxytocin: 20 fer-units/min  Contraction Frequency (minutes): 2, difficult to assess contraction pattern w/ patient on side  Contraction Quality: Moderate  Tachysystole: No   Dilation: 4-5        Effacement (%): 50  Station: -3  Baseline Rate: 140 bpm  Fetal Heart Rate: 150 BPM  FHR Category: Category I     Oxytocin Safety Progress Check: Safety check completed    Notes/comments:   Pitocin @ 20  Category I FHT x 2  SVE 4-5/50/-3  Patient expresses concern of undergoing a vaginal delivery of twin 1 with a  of twin 2 if twin 2 flips to breech  We discussed that there is no certainty that twin 2 would remain vertex after delivery of twin 1 and that her concerns are valid  The patient is unsure if she wishes to proceed with labor induction and is considering opting for  section of both twins  Patient wishes to speak to her  and will let us know what her decision is      Dr Daysi Fernández aware           Kenroy Vidal MD 2018 11:19 AM

## 2018-01-14 NOTE — PROGRESS NOTES
Pt reports she is comfortable with straight dose pitocin while in triage  Reports contractions getting stronger, but not more frequent  Discussed with charge nurse plan to move patient to clean labor room when possible for pitocin titration  Pt will be moved when possible      Diamante Khanna DO  OB/GYN, PGY2  1/13/2018, 8:04 PM

## 2018-01-15 LAB
BASOPHILS # BLD AUTO: 0.01 THOUSANDS/ΜL (ref 0–0.1)
BASOPHILS NFR BLD AUTO: 0 % (ref 0–1)
EOSINOPHIL # BLD AUTO: 0.04 THOUSAND/ΜL (ref 0–0.61)
EOSINOPHIL NFR BLD AUTO: 1 % (ref 0–6)
ERYTHROCYTE [DISTWIDTH] IN BLOOD BY AUTOMATED COUNT: 17.6 % (ref 11.6–15.1)
HCT VFR BLD AUTO: 22.8 % (ref 34.8–46.1)
HGB BLD-MCNC: 6.8 G/DL (ref 11.5–15.4)
LYMPHOCYTES # BLD AUTO: 1.75 THOUSANDS/ΜL (ref 0.6–4.47)
LYMPHOCYTES NFR BLD AUTO: 22 % (ref 14–44)
MCH RBC QN AUTO: 22.3 PG (ref 26.8–34.3)
MCHC RBC AUTO-ENTMCNC: 29.8 G/DL (ref 31.4–37.4)
MCV RBC AUTO: 75 FL (ref 82–98)
MONOCYTES # BLD AUTO: 0.79 THOUSAND/ΜL (ref 0.17–1.22)
MONOCYTES NFR BLD AUTO: 10 % (ref 4–12)
NEUTROPHILS # BLD AUTO: 5.44 THOUSANDS/ΜL (ref 1.85–7.62)
NEUTS SEG NFR BLD AUTO: 67 % (ref 43–75)
NRBC BLD AUTO-RTO: 0 /100 WBCS
PLATELET # BLD AUTO: 198 THOUSANDS/UL (ref 149–390)
PMV BLD AUTO: 11.1 FL (ref 8.9–12.7)
RBC # BLD AUTO: 3.05 MILLION/UL (ref 3.81–5.12)
RPR SER QL: NORMAL
WBC # BLD AUTO: 8.06 THOUSAND/UL (ref 4.31–10.16)

## 2018-01-15 PROCEDURE — 85025 COMPLETE CBC W/AUTO DIFF WBC: CPT | Performed by: OBSTETRICS & GYNECOLOGY

## 2018-01-15 RX ORDER — FERROUS SULFATE 325(65) MG
325 TABLET ORAL 2 TIMES DAILY WITH MEALS
Status: DISCONTINUED | OUTPATIENT
Start: 2018-01-15 | End: 2018-01-17 | Stop reason: HOSPADM

## 2018-01-15 RX ADMIN — SODIUM CHLORIDE, SODIUM LACTATE, POTASSIUM CHLORIDE, AND CALCIUM CHLORIDE 125 ML/HR: .6; .31; .03; .02 INJECTION, SOLUTION INTRAVENOUS at 05:25

## 2018-01-15 RX ADMIN — DOCUSATE SODIUM 100 MG: 100 CAPSULE, LIQUID FILLED ORAL at 09:37

## 2018-01-15 RX ADMIN — DOCUSATE SODIUM 100 MG: 100 CAPSULE, LIQUID FILLED ORAL at 18:27

## 2018-01-15 RX ADMIN — KETOROLAC TROMETHAMINE 30 MG: 30 INJECTION, SOLUTION INTRAMUSCULAR at 10:53

## 2018-01-15 RX ADMIN — Medication 325 MG: at 18:27

## 2018-01-15 RX ADMIN — KETOROLAC TROMETHAMINE 30 MG: 30 INJECTION, SOLUTION INTRAMUSCULAR at 05:24

## 2018-01-15 RX ADMIN — IBUPROFEN 600 MG: 600 TABLET ORAL at 20:20

## 2018-01-15 NOTE — PROGRESS NOTES
2017         RE: Nia Dennis                              To: [address]   MR#: [id number]                                   [address]   : [facility] [address] [facility], [address] [facility]   ENC: [id number]                             Fax: [phone]   (Exam #: [id number])      The LMP of this 32year old,  G5, P2-0-2-2 patient was unknown, her   working BURTON is FEB 10 80 and the current gestational age is 33 weeks 4   days by  [facility] [address]  A sonographic examination was performed on 2017 using real time equipment  The ultrasound examination was   performed using abdominal technique  The patient has a BMI of 30 8  Her   blood pressure today was 114/75  Earliest ultrasound found in her record: 17 8w4 and 8w6 giving an  BURTON   of 18 based on the larger of the twins        Fetus A   Cardiac motion was observed at 150 bpm       Fetus B   Cardiac motion was observed at 146 bpm       INDICATIONS      multiple gestation- monochorionic  diamniotic twins   Interval growth assesment   TTS surveillence   obesity      Exam Types      Level I      RESULTS      Fetus # 1 of 2   Vertex presentation   Fetal growth appeared normal   Fetal position = Maternal Right   Placenta Location = Left lateral   No placenta previa   Placenta Grade = II   Chorionicity = Monochorionic, Diamniotic      Fetus # 2 of 2   Breech presentation   Fetal growth appeared normal   Fetal position = Maternal Left   Placenta Location = Left lateral   No placenta previa   Placenta Grade = II   Chorionicity = Monochorionic, Diamniotic      MEASUREMENTS (* Included In Average GA)      FETUS A   AC              23 2 cm        27 weeks 4 days* (8%)   BPD              7 0 cm        28 weeks 1 day * (15%)   HC              26 0 cm        28 weeks 0 days* (5%)   Femur            5 5 cm        28 weeks 6 days* (30%)      Cerebellum       3 5 cm        30 weeks 3 days HC/AC           1 12   FL/AC           0 24   FL/BPD          0 78   EFW (Ac/Fl/Hc)  1173 grams - 2 lbs 9 oz                 (23%)      Fetus A AVERAGE G  A  is 28 weeks 1 day +/- 14 days  FETUS B   AC              23 9 cm        28 weeks 1 day * (20%)   BPD              7 2 cm        29 weeks 0 days* (31%)   HC              26 2 cm        28 weeks 2 days* (9%)   Femur            5 3 cm        28 weeks 1 day * (18%)      Cerebellum       3 4 cm        30 weeks 2 days      HC/AC           1 10   FL/AC           0 22   FL/BPD          0 73   EFW (Ac/Fl/Hc)  1196 grams - 2 lbs 10 oz                 (25%)      Fetus B AVERAGE G  A  is 28 weeks 3 days +/- 14 days  AMNIOTIC FLUID      Fetus A      Largest Vertical Pocket = 3 9 cm   Amniotic Fluid: Normal         Fetus B      Largest Vertical Pocket = 3 8 cm   Amniotic Fluid: Normal      FETAL VESSELS      Fetus A                                  S/D   PI    RI    PSV   AEDV RF                                                    cm/s       Middle Cerebral Artery     10 07 2 21  0 89  44 00         Fetus B                                  S/D   PI    RI    PSV   AEDV RF                                                    cm/s       Middle Cerebral Artery     6 45  1 93  0 83  35 00      ANATOMY COMMENTS      FETUS A   Fetal anatomy has been previously assessed and documented in our Center   and no anomalies were identified  No fetal structural abnormality is   identified on the Level I survey today  Follow up intracranial anatomy   (calvarium, cavum, lateral ventricles, and cerebellum), cardiac anatomy   (4chamber, LVOT, RVOT, and 3VV), diaphragm, stomach, kidneys, and bladder   appear normal             FETUS B   Fetal anatomy has been previously assessed and documented in our Center   and no anomalies were identified  No fetal structural abnormality is   identified on the Level I survey today   Follow up intracranial anatomy   (calvarium, cavum, lateral ventricles, and cerebellum), cardiac anatomy   (4chamber, LVOT, RVOT, and 3VV), diaphragm, stomach, kidneys, and bladder   appear normal       IMPRESSION      Twin IUP (Fetus A)   28 weeks and 1 day by this ultrasound  (BURTON=2018)   29 weeks and 4 days by 1st Tri Sono  (BURTON=2018)   Vertex presentation   Fetal growth appeared normal   Regular fetal heart rate of 150 bpm   Left lateral placenta   No placenta previa   Fetal position = Maternal , Right   Monochorionic, diamniotic      Twin IUP (Fetus B)   28 weeks and 3 days by this ultrasound  (BURTON=2018)   29 weeks and 4 days by 1st Tri Sono  (BURTON=2018)   Breech presentation   Fetal growth appeared normal   Regular fetal heart rate of 146 bpm   Left lateral placenta   No placenta previa   Fetal position = Maternal , Left   Monochorionic, diamniotic      GENERAL COMMENT         On exam today the patient appears well, in no acute distress, and denies   any complaints  Her abdomen is non-tender  Thank you for referring your patient to our Center for assessment of fetal   growth  This is a  monochorionic/diamniotic twin gestation  There has   been appropriate and concordant interval fetal growth; fetal weight   discordance is 2 %  Good fetal movement and tone are appreciated of both   twins  The amniotic fluid volume appears normal around each twin on   today's scan  The patient was informed of today's findings and all of her   questions were answered  The limitations of ultrasound were reviewed with   the patient, which she appears to understand  We discussed follow-up in detail and I recommend the patient return in 2   weeks for a twin-twin transfusion evaluation and to initiate twice weekly    surveillance for the indication of monochorionic twins  Thank you very much for allowing us to participate in the care of this   very nice patient  Should you have any questions, please do not hesitate   to contact our office  Please note, in addition to the time spent discussing the results of the   ultrasound, I spent approximately 10 minutes of face-to-face time with the   patient, greater than 50% of which was spent in counseling and the   coordination of care for this patient  Engana Pty, ADAN Hurtado  Electronically signed 11/28/17 13:03           Electronically signed Micaela MENG    Dec  7 2017 10:14AM EST Acknowledgement

## 2018-01-15 NOTE — PROGRESS NOTES
Progress Note - OB/GYN   Felisha Camarillo 29 y o  female MRN: [id number]  Unit/Bed#:  312-01 Encounter: [id number]    Assessment:  POD#1 s/p Primary low transverse  section, mono-di twins, stable    Plan:  1  Anemia: Intraoperative blood loss 800 cc, Hgb 8 9->6 8, continue to monitor, consider iron, blood transfusion  2  Continue routine postoperative care  3  Encourage ambulation  4  Encourage breastfeeding  5  Pain control as needed    Disposition: stable, anticipate discharge POD #3    Subjective/Objective   Chief Complaint:     POD#3 s/p Primary low transverse  section    Subjective:     Pain: no  Tolerating PO: yes  Voiding: rivas out, for voiding trial  Flatus: yes  BM: no  Ambulating: rivas out, will encourage ambulation  Breastfeeding: Breastfeeding and Bottle feeding  Chest pain: no  Shortness of breath: no  Leg pain: no  Lochia: scant    Objective:     Vitals:  Vitals:    18 2000 18 2100 18 2356 01/15/18 0409   BP: 100/56 95/50 (!) 87/46 114/77   Pulse: 98 83 76 87   Resp:    Temp: 97 7 °F (36 5 °C) 98 °F (36 7 °C) 98 1 °F (36 7 °C) 98 °F (36 7 °C)   TempSrc: Oral Oral Oral Oral   SpO2: 100% 100% 99% 100%   Weight:       Height:           Physical Exam:     Physical Exam   Constitutional: She is oriented to person, place, and time  She appears well-developed and well-nourished  Cardiovascular: Normal rate, regular rhythm and normal heart sounds  Pulmonary/Chest: Effort normal and breath sounds normal    Abdominal: Soft  Bowel sounds are normal    Incision clean, dry, and intact  Closed with running absorbable sutures  Neurological: She is alert and oriented to person, place, and time  Uterine fundus firm and non-tender, at the umbilicus       Lab, Imaging and other studies: I have personally reviewed pertinent reports        Lab Results   Component Value Date    WBC 8 06 01/15/2018    HGB 6 8 (LL) 01/15/2018    HCT 22 8 (L) 01/15/2018    MCV 75 (L) 01/15/2018     01/15/2018               Emerson Oneil DO  01/15/18

## 2018-01-15 NOTE — LACTATION NOTE
This note was copied from a baby's chart  Met with Akash Bond at her twin sons bedsides in the NICU  Akash Ronda stated that she wants her sons to have breastmilk but seems undecided on whether she wants to feed at the breast or pump and bottle feed  I reassured her that it is possible to feed both babies at the breast and  that I would help her either to put to the babies to the breast or make sure she is getting enough milk to feed her sons breast milk by pumping  When asked when she last pumped, she stated that she pumped yesterday--01/14/18 and had not pumped today  I reinforced the importance of pumping a minimum of 8 times/day with at least one pumping occurring sometime between midnight and 0300  She will be receiving a pump for home use  Also discussed importance of well balanced meals and adequate fluid intake  Will follow

## 2018-01-15 NOTE — SOCIAL WORK
NICU admission for twins and consult for breast pump for pt  Per chart, twin boys were born via c/s on 1/14 at 39 2/7wks; both in NICU for low temps  Twin#1 named Sean Seek  Twin#2 named Bailey  Met with pt ([phone]) and FOB/SO Adrienne Yeager (same cell as pt as his is currently broken) to introduce CM services and provide CM contact info  Pt and FOB report they are doing well and these babies make a total of 4 kids for pt and 6 kids for the family  Other kids are ages 9, 10, 10, and 11 yrs old  Pt and FOB report living together in [place] with their kids, have good support system with family and friends including FOB's mom Anthony Loomis ([phone])  Pt and FOB report having all baby supplies needed except breast pump  Per pt request, Medela pump ordered from [facility] via [facility] for room delivery tomorrow  Pt reports family does have cars for transportation as needed and family/friends to help with rides  Ped is [facility] in [facility]  No mental health issues or substance use noted  Pt and FOB deny any other CM needs at this time  No other needs noted  Will follow

## 2018-01-15 NOTE — MISCELLANEOUS
Message  Return to work or school:   Jackie Pizarro is under my professional care  She was seen in my office on 10/09/2017  PLEASE ALLOW PT TO WORK FROM HOME UNTIL 8 WEEKS POST PARTUM  HER BURTON IS 2/9/18          Signatures   Electronically signed by : Murali Dexter, ; Oct 12 2017  1:53PM EST                       (Co-author)

## 2018-01-15 NOTE — PLAN OF CARE
DISCHARGE PLANNING     Discharge to home or other facility with appropriate resources Progressing        INFECTION - ADULT     Absence or prevention of progression during hospitalization Progressing     Absence of fever/infection during neutropenic period Progressing        Knowledge Deficit     Patient/family/caregiver demonstrates understanding of disease process, treatment plan, medications, and discharge instructions Progressing     Verbalizes understanding of labor plan Progressing        PAIN - ADULT     Verbalizes/displays adequate comfort level or baseline comfort level Progressing        POSTPARTUM     Experiences normal postpartum course Progressing     Appropriate maternal -  bonding Progressing     Establishment of infant feeding pattern Progressing     Incision(s), wounds(s) or drain site(s) healing without S/S of infection Progressing        SAFETY ADULT     Patient will remain free of falls Progressing     Maintain or return to baseline ADL function Progressing     Maintain or return mobility status to optimal level Progressing

## 2018-01-16 RX ADMIN — Medication 325 MG: at 07:37

## 2018-01-16 RX ADMIN — Medication 325 MG: at 19:50

## 2018-01-16 RX ADMIN — DOCUSATE SODIUM 100 MG: 100 CAPSULE, LIQUID FILLED ORAL at 08:56

## 2018-01-16 RX ADMIN — DOCUSATE SODIUM 100 MG: 100 CAPSULE, LIQUID FILLED ORAL at 19:49

## 2018-01-16 RX ADMIN — IBUPROFEN 600 MG: 600 TABLET ORAL at 13:45

## 2018-01-16 NOTE — MISCELLANEOUS
Message  pt called with c/o vag spotting" pt states changes pad only 1 time per day" denies pain or cramping  informed pt to monitor blding elevate legs when able & , to increase water intake & to call if vag blding increases or has pain/cramping  pt has an appt for tues 6/21/16 for h & p      Active Problems    1  Encounter for removal of intrauterine contraceptive device (V25 12) (Z30 432)   2  Encounter for routine gynecological examination with Papanicolaou smear of cervix   (V72 31,V76 2) (Z01 419)   3  Pregnancy (V22 2) (Z33 1)   4  Presence of intrauterine contraceptive device (IUD) (V45 51) (Z97 5)    Current Meds   1  Prenatal Vitamins 28-0 8 MG Oral Tablet; TAKE 1 TABLET DAILY; Therapy: [id number] to (Evaluate:[id number])  Requested for: [id number]; Last   Rx:[id number] Ordered    Allergies    1  Penicillins    2  No Known Environmental Allergies   3   No Known Food Allergies    Signatures   Electronically signed by : Castillo Ford RN; Jun 16 2016  1:21PM EST                       (Author)

## 2018-01-16 NOTE — PROGRESS NOTES
Progress Note - OB/GYN   Gilda Arriaga 29 y o  female MRN: [id number]  Unit/Bed#: -01 Encounter: [id number]    Assessment:  Postop Day #2 s/p 1LTCS for mono-di twins, stable      Plan:  1) Acute blood loss anemia   Hgb 8 9 --> 6 8   Patient is asymptomatic, denies lightheadedness, SOB, chest pain, N/V   VSS  2) Continue routine post partum/post op care   Encourage ambulation   Encourage breastfeeding   Anticipate discharge tomorrow (1/17/18)    Subjective/Objective   Chief Complaint:     Post delivery  Patient is feeling well  She is able to ambulate without feeling lightheaded, dizzy, or short of breath  Lochia WNL  Pain is well controlled  Subjective:     Pain: yes, incisional, improved with meds  Tolerating PO: yes  Voiding: yes  Flatus: yes  BM: no  Ambulating: yes  Breastfeeding:  Yes, pumping  Chest pain: no  Shortness of breath: no  Leg pain: no  Lochia: minimal    Objective:     Vitals: /82   Pulse 82   Temp 98 5 °F (36 9 °C) (Oral)   Resp 18   Ht 5' 1" (1 549 m)   Wt 77 1 kg (170 lb)   LMP 04/05/2017 (Exact Date)   SpO2 100%   Breastfeeding?  Yes   BMI 32 12 kg/m²       Intake/Output Summary (Last 24 hours) at 01/16/18 3087  Last data filed at 01/15/18 0959   Gross per 24 hour   Intake           322 92 ml   Output             1000 ml   Net          -677 08 ml       Physical Exam:     AAOx3, NAD  CV, RRR  CTA b/l, no WRR  Soft, non-tender, non-distended, no rebound or guarding, no CVA tenderness  Uterine fundus firm and non-tender, at the umbilicus   Incision clean/dry/intact without signs of inflammation   Non tender      Lab Results   Component Value Date    WBC 8 06 01/15/2018    HGB 6 8 (LL) 01/15/2018    HCT 22 8 (L) 01/15/2018    MCV 75 (L) 01/15/2018     01/15/2018                         Ricky Edge MD  1/16/2018  6:21 AM

## 2018-01-16 NOTE — PROGRESS NOTES
2017         RE: Moe Simeon                              To: [address]   MR#: [id number]                                   [address]   : [place] [address], [address] [facility]   ENC: [id number]                             Fax: [phone]   (Exam #: [id number])      The LMP of this 32year old,  G5, P2-0-2-2 patient was 2017, her   working BURTON is 2018 and the current gestational age is 11 weeks 6   days by [address] [place] [address]  A sonographic examination was performed on 2017 using real time equipment  The ultrasound examination was performed   using abdominal technique  The patient has a BMI of 27 2  Her blood   pressure today was 131/79  Earliest ultrasound found in her record: 17 8w4 and 8w6 giving an  BURTON   of 18 based on the larger of the twins  Ms Renee Shannon is on prenatal vitamins and reports an allergy to penicillin  She denies any use of cigarettes, alcohol or illicit drugs  Her past   medical history is significant for gonorrhea diagnosed in  for which   she was treated  She denies any prior surgical history  Her OB history   includes 2 prior term pregnancies a year apart in which both were   delivered vaginally and both weighed 7 lbs  6 oz  She also reports she had   a first trimester miscarriage in  and   She denies any first   generation family history of hypertension, diabetes, thrombosis or   congenital anomalies  She reports she has a younger cousin who has sickle   cell disease  She has not completed her lab work at to see if she is a   character for sickle cell  She is here today for a sequential screen  Multiple longitudinal and transverse sections revealed a twin intrauterine   pregnancy  Fetus A   A normal gestational sac was documented  A normal fetal pole was   visualized  Cardiac motion was observed at 179 bpm  The yolk sac was seen        Fetus B   A normal gestational sac was documented  A normal fetal pole was   visualized  Cardiac motion was observed at 178 bpm  The yolk sac was seen  INDICATIONS      pregnancy dating      Exam Types      Level I      MEASUREMENTS (* Included In Average GA)      FETUS A   CRL              2 3 cm        8 weeks 6 days *      Fetus A AVERAGE G  A  is 8 weeks 6 days +/- 5 days  FETUS B   CRL              2 0 cm        8 weeks 4 days *      Fetus B AVERAGE G  A  is 8 weeks 4 days +/- 5 days  ADNEXA      The left ovary was not visualized  The right ovary was not visualized  IMPRESSION      Twin IUP (Fetus A)   8 weeks and 6 days by this ultrasound  (BURTON=2018)   8 weeks and 6 days by 1st Tri Sono  (BURTON=2018)   Regular fetal heart rate of 179 bpm   Monochorionic, diamniotic      Twin IUP (Fetus B)   8 weeks and 4 days by this ultrasound  (BURTON=2018)   8 weeks and 6 days by 1st Tri Sono  (BURTON=2018)   Regular fetal heart rate of 178 bpm   Monochorionic, diamniotic      GENERAL COMMENT      OFFICE CONSULT      Dear Grace Tineo      Thank you for requesting a  consultation on your patient Ms Emeterio Feliciano for the following indications: monochorionic diamniotic twins         The patient was informed of the findings and counseled about the   limitations of the exam in detecting all forms of fetal congenital   abnormalities  She denies any vaginal bleeding or uterine cramping/contractions  Exam shows she is comfortable and her abdomen is non tender  Follow up recommended: This pregnancy is at increased risk over singletons for gestational   diabetes,  delivery, preeclampsia and IUGR  There is also a 15%   risk for twin to twin transfusion to develop if the blood flow with in the   single placenta is not shared evenly  For the remainder of the pregnancy I   recommend the followin   Extra iron (60-80mg),  calcium (8100LN ) and folic acid (1mg) during   the pregnancy due to the nutritional demands of a twin gestation  2  I recommend an early glucola, then a repeat glucola at 28 weeks as   twins have a higher risk to develop gestational diabetes  3  A sequential screen is planned for 12-13 weeks  Her anatomy scan is   planned for 20 weeks along with a cervix length to see if she has a short   cervix that would put her a higher risk for a  delivery then a   normal twin pregnancy  Ultrasounds for growth, and fluid are suggested   every 4 weeks starting at 16 weeks with a scan in between these 4 week   growths looking for signs of twin to twin transfusion  If there is   discordancy greater than 20%, then more frequent surveillance may be   indicated  Will add a fetal echo to one of her scans as there is a 4% risk   for cardiac malformations in monozygous twin pregnancies  4  Recommend fetal testing begin at 32 weeks with twice weekly nst and   weekly jason unless either or both of the fetuses are <10% or there is a   discordancy rate of 20% or more  5  I encourage maternal teaching about how to feel for contractions after   20+ weeks and to notify your office if contractions are occurring 4 or   more per hour  Routine cessation of working is not recommended but may be   considered based on the job requirements  If signs of  labor are   detected then steroids to enhance lung maturity can be offered till 34   weeks and magnesium sulfate can be offered till 32 weeks to lower the risk   for cerebral palsy to develop postnatally  6  Close observation for signs of preeclampsia  7  There are no prospective randomized trials that have been able to show   the optimal timing for delivery of monochorionic twins  Most twins will   deliver between 35-37 weeks on their own   For the monochorionic twin   pregnancy that does not deliver spontaneously, delivery can be offered   anytime after 37 weeks by expert opinion as their risk for stillbirth is   felt to be higher then in dichorionic/diamniotic twins  8  Can offer baby aspirin to start after the first trimester and before 16   weeks to lessen her risk to develop preeclampsia  The majority of time (greater then 50%) was spent on counseling and   coordination of care of this patient and /or family member  Approximate   face to face time was 15minutes  RADHA Sparks M D     Maternal-Fetal Medicine   Electronically signed 07/06/17 16:09

## 2018-01-16 NOTE — LACTATION NOTE
This note was copied from a baby's chart  Spoke with Love Perrin in the NICU  She is pumping more often and is getting more colostrum  Encouraged her to keep doing what she is doing  Explained transition from colostrum to term milk

## 2018-01-17 VITALS
BODY MASS INDEX: 32.1 KG/M2 | OXYGEN SATURATION: 100 % | WEIGHT: 170 LBS | DIASTOLIC BLOOD PRESSURE: 74 MMHG | HEART RATE: 70 BPM | TEMPERATURE: 97.4 F | RESPIRATION RATE: 18 BRPM | HEIGHT: 61 IN | SYSTOLIC BLOOD PRESSURE: 129 MMHG

## 2018-01-17 LAB
ABO GROUP BLD BPU: NORMAL
ABO GROUP BLD BPU: NORMAL
BPU ID: NORMAL
BPU ID: NORMAL
UNIT DISPENSE STATUS: NORMAL
UNIT DISPENSE STATUS: NORMAL
UNIT PRODUCT CODE: NORMAL
UNIT PRODUCT CODE: NORMAL
UNIT RH: NORMAL
UNIT RH: NORMAL

## 2018-01-17 RX ORDER — DOCUSATE SODIUM 100 MG/1
100 CAPSULE, LIQUID FILLED ORAL 2 TIMES DAILY
Qty: 10 CAPSULE | Refills: 0 | Status: SHIPPED | OUTPATIENT
Start: 2018-01-17

## 2018-01-17 RX ORDER — OXYCODONE HYDROCHLORIDE AND ACETAMINOPHEN 5; 325 MG/1; MG/1
1 TABLET ORAL EVERY 4 HOURS PRN
Qty: 25 TABLET | Refills: 0 | Status: SHIPPED | OUTPATIENT
Start: 2018-01-17 | End: 2018-01-27

## 2018-01-17 RX ORDER — IBUPROFEN 600 MG/1
600 TABLET ORAL EVERY 6 HOURS PRN
Qty: 30 TABLET | Refills: 0 | Status: SHIPPED | OUTPATIENT
Start: 2018-01-17

## 2018-01-17 RX ADMIN — IBUPROFEN 600 MG: 600 TABLET ORAL at 03:09

## 2018-01-17 RX ADMIN — Medication 325 MG: at 08:22

## 2018-01-17 RX ADMIN — DOCUSATE SODIUM 100 MG: 100 CAPSULE, LIQUID FILLED ORAL at 08:22

## 2018-01-17 NOTE — CASE MANAGEMENT
Notification of Maternity Inpatient Admission/Maternity Inpatient Authorization Request  This is a Notification of Maternity Inpatient Admission/Maternity Inpatient Authorization Request to our facility [facility]  Please be advised that this patient is currently in our facility under Inpatient Status  Below you will find the Birth/ Summary, Attending Physician and Facilitys information including NPI# and contact for the Utilization  assigned to the [facility] where the patient is receiving services  Please feel free to contact the Utilization Review Department with any questions  Mothers Information:  Néstor Lee  MRN: [id number]  YOB: 1990  Admission Date: 2018  1:26 PM  Discharge Date: 2018  4:29 PM  Disposition: Home/Self Care  Admitting Diagnosis:   O82  DELIVERY  Twins [Z38 5]  Patient-requested procedure [Z41 9]   Information:  Estimated Date of Delivery: 18  Information for the patient's :  Lazara Dupes [[id number]]     Troy Delivery Information:  Sex: male  Delivered 2018 1:55 PM by , Low Transverse; Gestational Age: 37w1d    Troy Measurements:  Weight: 4 lb 9 oz (2070 g); Height: 17"    APGAR 1 minute 5 minutes 10 minutes   Totals: 8 9      Information for the patient's :  Arnold Staples Boy 2 Myrl Boys [[id number]]     Troy Delivery Information:  Sex: male  Delivered 2018 1:57 PM by , Low Transverse; Gestational Age: 37w1d    Troy Measurements:  Weight: 4 lb 11 1 oz (2130 g); Height: 17"    APGAR 1 minute 5 minutes 10 minutes   Totals: 8 9      OB History      Para Term  AB Living    5 3 2 1 2 4    SAB TAB Ectopic Multiple Live Births    2     1 4        Attending Physician:  ADAN Dang    Specialty- Obstetrics and Gynecology  [facility] ID- [id number]  [address] [place] [address] [place] [address]  Phone 1: [phone]  Phone 2: [phone]  Fax: [phone] [facility])  [address] [place] [address]  [phone]  Tax ID: [id number]  NPI: [id number]    [address] [facility] in the [facility] by Jayson Snow for 2017  Network Utilization Review Department  Phone: [phone]; Fax [phone]  ATTENTION: The Network Utilization Review Department is now centralized for our 7 Facilities  Make a note that we have a new phone and fax numbers for our Department  Please call with any questions or concerns to [phone] and carefully follow the prompts so that you are directed to the right person  All voicemails are confidential  Fax any determinations, approvals, denials, and requests for initial or continue stay review clinical to [phone]  Due to HIGH CALL volume, it would be easier if you could please send faxed requests to expedite your requests and in part, help us provide discharge notifications faster

## 2018-01-17 NOTE — PROGRESS NOTES
AUG 3 2017         RE: Severino Owusu                              To: [address]   MR#: [id number]                                   [address]    : [address] [place] [address], [address] [place] [address]   ENC: [id number]                             Fax: [phone]   (Exam #: [id number])      The LMP of this 32year old,  G5, P2-0-2-2 patient was 2017, her   working BURTON is 2018 and the current gestational age is 16 weeks 6   days by [address] [place], [place] [address] [address]  A sonographic examination was performed on AUG   3 2017 using real time equipment  The ultrasound examination was performed   using abdominal technique  The patient has a BMI of 27 4  Her blood   pressure today was 126/82  Earliest ultrasound found in her record: 17 8w4 and 8w6 giving an  BURTON   of 18 based on the larger of the twins  Multiple longitudinal and   transverse sections revealed a twin intrauterine pregnancy  Fetus A is in   variable presentation and fetus B is in variable presentation  The   placenta for Fetus A is posterior, left lateral in implantation, grade 0   in appearance  The placenta for Fetus B is posterior, left lateral in   implantation, grade 0 in appearance  Fetus A   Cardiac motion was observed at 169 bpm       Fetus B   Cardiac motion was observed at 160 bpm       INDICATIONS      multiple gestation- monochorionic  diamniotic twins   first trimester genetic screening      Exam Types      Level I      RESULTS      Fetus # 1 of 2   Variable presentation      Fetus # 2 of 2   Variable presentation      MEASUREMENTS (* Included In Average GA)      FETUS A   CRL              6 9 cm        12 weeks 6 days*   Nuchal Trans    1 70 mm      Fetus A AVERAGE G  A  is 12 weeks 6 days +/- 7 days  FETUS B   CRL              7 0 cm        13 weeks 0 days*   Nuchal Trans    1 60 mm      Fetus B AVERAGE G  A  is 13 weeks 0 days +/- 7 days        ANATOMY COMMENTS FETUS A   Anatomic detail is limited at this gestational age  The yolk sac was not   noted  The fetal cranium appeared normal in shape and the nuchal   translucency was normal in size (1 7 mm)  The nasal bone appears to be   present  The intracranial anatomy was unremarkable  Evaluation of the   spine revealed no obvious evidence for a neural tube defect  Anatomy of   the fetal thorax appeared within normal limits  The cardiac rhythm was   regular  Within the abdomen, stomach & bladder were visualized and the   abdominal wall appeared intact  A three vessel cord appears to be present  Active movement of the fetal body & extremities was seen  There is no   suspicion of a subchorionic bleed  The placental cord insertion was   normal    There is no suspicion of a uterine myoma  Free fluid is not seen   in the posterior cul-de-sac  FETUS B   Anatomic detail is limited at this gestational age  The yolk sac was not   noted  The fetal cranium appeared normal in shape and the nuchal   translucency was normal in size (1 6 mm)  The nasal bone appears to be   present  The intracranial anatomy was unremarkable  Evaluation of the   spine revealed no obvious evidence for a neural tube defect  Anatomy of   the fetal thorax appeared within normal limits  The cardiac rhythm was   regular  Within the abdomen, stomach & bladder were visualized and the   abdominal wall appeared intact  A three vessel cord appears to be present  Active movement of the fetal body & extremities was seen  There is no   suspicion of a subchorionic bleed  The placental cord insertion was   normal    There is no suspicion of a uterine myoma  Free fluid is not seen   in the posterior cul-de-sac  ADNEXA      The left ovary appeared normal and measured 3 0 x 2 7 x 1 8 cm with a   volume of 7 6 cc  The right ovary was not visualized        AMNIOTIC FLUID      Fetus A      Largest Vertical Pocket = 3 0 cm   Amniotic Fluid: Normal Fetus B      Largest Vertical Pocket = 2 6 cm   Amniotic Fluid: Normal      IMPRESSION      Twin IUP (Fetus A)   12 weeks and 6 days by this ultrasound  (BURTON=FEB 9 2018)   12 weeks and 6 days by 1st Tri Sono  (BURTON=FEB 9 2018)   Variable presentation   Regular fetal heart rate of 169 bpm   Posterior, left lateral placenta   Monochorionic, diamniotic      Twin IUP (Fetus B)   13 weeks and 0 days by this ultrasound  (BURTON=FEB 8 2018)   12 weeks and 6 days by 1st Tri Sono  (BURTON=FEB 9 2018)   Variable presentation   Regular fetal heart rate of 160 bpm   Posterior, left lateral placenta   Monochorionic, diamniotic      GENERAL COMMENT      Today's ultrasound is overall reassuring  There is reassuring concordance   in fetal growth, amniotic fluid, and nuchal translucency without any   evidence suggestive of evolving twin-twin transfusion syndrome  We discussed the options for genetic screening, including but not limited   to first trimester screening, second trimester screening, combined first   and second trimester screening, noninvasive prenatal testing (NIPT) for   patients at high risk and diagnostic screening through the use of CVS and   amniocentesis  We discussed the risks and benefits of each approach   including the sensitivities and false positive rates as well as the   difference between a screening test and a diagnostic test  We discussed   the lower sensitivity in Twins  At the conclusion of our discussion the   patient elected Sequential Screening to delineate her risk for fetal   aneuploidy  A maternal blood sample was obtained on the day of sonogram     The first trimester portion of the screening results, encompassing age,   nuchal translucency, and biochemistry should be available within one week   of testing and will be reported from [facility]   The second stage of   sequential screening should be completed between the 15th and 21st week of   pregnancy (ideally between 16-18 weeks)        We discussed follow-up in detail and Yonatan Ledesma  has been appropriately   scheduled by Dr Ila Smith for twin-twin transfusion evaluations and her   detail fetal anatomic evaluation  Thank you very much for allowing us to participate in the care of this   very nice patient  Should you have any questions, please do not hesitate   to contact our office  Please note, in addition to the time spent discussing the results of the   ultrasound, I spent approximately 10 minutes of face-to-face time with the   patient, greater than 50% of which was spent in counseling and the   coordination of care for this patient  Portions of the record may have been created with voice recognition   software  Occasional wrong word or "sound a like" substitutions may have   occurred due to the inherent limitations of voice recognition software  Read the chart carefully and recognize, using context, where substitutions   have occurred  RADHA Self , ADAN Issa     Electronically signed 08/03/17 10:03

## 2018-01-17 NOTE — PLAN OF CARE
DISCHARGE PLANNING     Discharge to home or other facility with appropriate resources Completed        INFECTION - ADULT     Absence or prevention of progression during hospitalization Completed     Absence of fever/infection during neutropenic period Completed        Knowledge Deficit     Patient/family/caregiver demonstrates understanding of disease process, treatment plan, medications, and discharge instructions Completed     Verbalizes understanding of labor plan Completed        PAIN - ADULT     Verbalizes/displays adequate comfort level or baseline comfort level Completed        POSTPARTUM     Experiences normal postpartum course Completed     Appropriate maternal -  bonding Completed     Establishment of infant feeding pattern Completed     Incision(s), wounds(s) or drain site(s) healing without S/S of infection Completed        SAFETY ADULT     Patient will remain free of falls Completed     Maintain or return to baseline ADL function Completed     Maintain or return mobility status to optimal level Completed

## 2018-01-17 NOTE — PROGRESS NOTES
Progress Note - OB/GYN   Rosey Choudhury 29 y o  female MRN: [id number]  Unit/Bed#: -01 Encounter: [id number]    Assessment:  Postop Day #4 s/p 1LTCS for mono-di twins, stable    Plan:  1) Acute blood loss anemia              Hgb 8 9 --> 6 8              Patient is asymptomatic, denies lightheadedness, SOB, chest pain, N/V              VSS  2) Continue routine post partum/post op care              Encourage ambulation              Encourage breastfeeding   Patient wants to be discharged tomorrow    Subjective/Objective   Chief Complaint:     Post delivery  Patient is feeling well but wants to stay an extra night because she wants to make sure "everything is okay " Although she has been ambulating, she is worried about having to walk to and from the bathroom by herself and dealing with her  pain at home  She is currently pumping  Twins are in the NICU  Lochia WNL  Subjective:     Pain: yes, incisional, improved with meds  Tolerating PO: yes  Voiding: yes  Flatus: yes  BM: no  Ambulating: yes  Breastfeeding:  yes  Chest pain: no  Shortness of breath: no  Leg pain: no  Lochia: minimal    Objective:     Vitals: /73   Pulse 85   Temp 98 4 °F (36 9 °C) (Oral)   Resp 18   Ht 5' 1" (1 549 m)   Wt 77 1 kg (170 lb)   LMP 2017 (Exact Date)   SpO2 100%   Breastfeeding?  Yes   BMI 32 12 kg/m²     No intake or output data in the 24 hours ending 18 0616    Physical Exam:     AAOx3, NAD  CV, RRR  CTA b/l, no WRR  Soft, non-tender, non-distended, no rebound or guarding, no CVA tenderness  Uterine fundus firm and non-tender, at the umbilicus   Incision clean/dry/intact without signs of inflammation   Non tender      Lab Results   Component Value Date    WBC 8 06 01/15/2018    HGB 6 8 (LL) 01/15/2018    HCT 22 8 (L) 01/15/2018    MCV 75 (L) 01/15/2018     01/15/2018                         Greta Lutz MD  2018  6:16 AM

## 2018-01-17 NOTE — LACTATION NOTE
Powerpoint given on breastfeeding class at patient request     Instructions given on pumping  Discussed when to start, frequency, different pumps available versus manual expression  Discussed hygiene of hands when assembling pump  Discussed labeling of milk, storage, and preparation of stored milk  Provided information on how to access resources for the [facility] Mothers of [address] [facility] and positions for breastfeeding twins  Discussed feeding log with twins  Emphasized 8 or more (12) feedings in a 24 hour period, what to expect for the number of diapers per day of life and the progression of properties of the  stooling pattern  Discussed s/s that breastfeeding is going well after day 4 and when to get help from a pediatrician or lactation support person after day 4  Booklet included Breast Pumping Instructions, When You Go Back to Work or School, and Breastfeeding Resources for after discharge including access to the number for the [employer]

## 2018-01-17 NOTE — SOCIAL WORK
Per pt and FOB request, provided NICU resource packet with social security and CM contact info  Pt and FOB agreeable to same and deny any other CM needs at this time

## 2018-01-18 ENCOUNTER — GENERIC CONVERSION - ENCOUNTER (OUTPATIENT)
Dept: OTHER | Facility: OTHER | Age: 28
End: 2018-01-18

## 2018-01-18 NOTE — PROGRESS NOTES
2017         RE: Linus Beard                              To: [address]   MR#: [id number]                                   [address]   : [facility] 8 [address] [facility], [address] [facility]   ENC: [id number]                             Fax: [phone]   (Exam #: [id number])      The LMP of this 32year old,  G5, P2-0-2-2 patient was unknown, her   working BURTON is FEB 10 80 and the current gestational age is 22 weeks 5   days by  [facility] [address]  A sonographic examination was performed on 2017 using real time equipment  The ultrasound examination was performed   using abdominal technique  The patient has a BMI of 29 6  Her blood   pressure today was 115/79  Earliest ultrasound found in her record: 17 8w4 and 8w6 giving an  BURTON   of 18 based on the larger of the twins        Fetus A   Cardiac motion was observed at 154 bpm       Fetus B   Cardiac motion was observed at 159 bpm       INDICATIONS      multiple gestation- monochorionic  diamniotic twins   Interval growth assesment   TTS surveillence      Exam Types      Level I      RESULTS      Fetus # 1 of 2   Vertex presentation   Fetal position = Maternal Right   Placenta Location = Left lateral   No placenta previa   Placenta Grade = I   Chorionicity = Monochorionic, Diamniotic      Fetus # 2 of 2   Breech presentation   Fetal growth appeared normal   Fetal position = Maternal Left   Placenta Location = Left lateral   No placenta previa   Placenta Grade = I   Chorionicity = Monochorionic, Diamniotic      MEASUREMENTS (* Included In Average GA)      FETUS A   AC              20 2 cm        24 weeks 4 days* (27%)   BPD              6 2 cm        25 weeks 1 day * (33%)   HC              22 7 cm        24 weeks 4 days* (14%)   Femur            4 5 cm        25 weeks 1 day * (29%)      Cerebellum       2 9 cm        26 weeks 3 days      HC/AC           1 13   FL/AC           0 23 FL/BPD          0 73   EFW (Ac/Fl/Hc)   741 grams - 1 lbs 10 oz                 (19%)      Fetus A AVERAGE G  A  is 24 weeks 6 days +/- 14 days  FETUS B   AC              20 2 cm        24 weeks 5 days* (28%)   BPD              6 2 cm        25 weeks 1 day * (32%)   HC              22 7 cm        24 weeks 4 days* (15%)   Femur            4 7 cm        25 weeks 6 days* (41%)      Cerebellum       2 9 cm        26 weeks 3 days      HC/AC           1 12   FL/AC           0 23   FL/BPD          0 76   EFW (Ac/Fl/Hc)   771 grams - 1 lbs 11 oz                 (26%)      Fetus B AVERAGE G  A  is 25 weeks 1 day +/- 14 days  AMNIOTIC FLUID      Fetus A      Largest Vertical Pocket = 3 2 cm   Amniotic Fluid: Normal         Fetus B      Largest Vertical Pocket = 4 7 cm   Amniotic Fluid: Normal      FETAL VESSELS      Fetus A                                  S/D   PI    RI    PSV   AEDV RF                                                    cm/s       Middle Cerebral Artery     6 39  1 97  0 84  26 20         Fetus B                                  S/D   PI    RI    PSV   AEDV RF                                                    cm/s       Middle Cerebral Artery     5 57  1 76  0 82  28 29 No      ANATOMY COMMENTS      FETUS A   Fetal anatomy has been previously assessed and documented in our Center   and no anomalies were identified  No fetal structural abnormality is   identified on the Level I survey today  Follow up intracranial anatomy   (calvarium, cavum, lateral ventricles, and cerebellum), cardiac anatomy   (4chamber, LVOT, RVOT, and 3VV), diaphragm, stomach, kidneys, and bladder   appear normal             FETUS B   Fetal anatomy has been previously assessed and documented in our Center   and no anomalies were identified  No fetal structural abnormality is   identified on the Level I survey today   Follow up intracranial anatomy   (calvarium, cavum, lateral ventricles, and cerebellum), cardiac anatomy (4chamber, LVOT, RVOT, and 3VV), diaphragm, stomach, kidneys, and bladder   appear normal       IMPRESSION      Twin IUP (Fetus A)   24 weeks and 6 days by this ultrasound  (BURTON=FEB 15 2018)   25 weeks and 5 days by 1st Tri Sono  (BURTON=2018)   Vertex presentation   Regular fetal heart rate of 154 bpm   Left lateral placenta   No placenta previa   Fetal position = Maternal , Right   Monochorionic, diamniotic      Twin IUP (Fetus B)   25 weeks and 1 day by this ultrasound  (BURTON=2018)   25 weeks and 5 days by 1st Tri Sono  (BURTON=2018)   Breech presentation   Fetal growth appeared normal   Regular fetal heart rate of 159 bpm   Left lateral placenta   No placenta previa   Fetal position = Maternal , Left   Monochorionic, diamniotic      GENERAL COMMENT      On exam today the patient appears well, in no acute distress, and denies   any complaints  Her abdomen is non-tender  There has been appropriate and concordant interval fetal growth  Good   fetal movement and tone are seen on each twin  The amniotic fluid volume   appears normal around each twin  The MCA Dopplers are normal for   gestational age without evidence to suggest twin anemia polycythemia   sequence  The patient was informed of today's findings and all of her   questions were answered  The limitations of ultrasound were reviewed with   the patient   labor precautions and fetal kick counts were   reviewed  Recommend the patient return in 2 weeks for a TTTS evaluation and in 4   weeks for a growth evaluation  Thank you very much for allowing us to participate in the care of this   very nice patient  Should you have any questions, please do not hesitate   to contact our office        Please note, in addition to the time spent discussing the results of the   ultrasound, I spent approximately 10 minutes of face-to-face time with the   patient, greater than 50% of which was spent in counseling and the   coordination of care for this patient  RADHA Fregoso , R YUSRA NUNEZ S  ADAN Garcia  Electronically signed 11/01/17 14:16           Electronically signed by:Yana MENG    Nov 7 2017  9:11AM EST Review

## 2018-01-18 NOTE — RESULT NOTES
Verified Results  (1) GLUCOSE, 1HR PG (50gm Glu Challenge Preg-Pts) 52Ujo0183 07:31AM [facility] Order Number: [id number]     Test Name Result Flag Reference   GLUCOSE, 1 Hr  mg/dL H See Comment   Specimen collection should occur prior to Sulfasalazine administration due to the potential for falsely depressed results  Specimen collection should occur prior to Sulfapyridine administration due to the potential for falsely elevated results  Specimen collection should occur prior to Sulfasalazine administration due to the potential for falsely depressed results  Specimen collection should occur prior to Sulfapyridine administration due to the potential for falsely elevated results  Plan  Abnormal glucose tolerance test, Monochorionic diamniotic twin gestation    · (1) GLUCOSE TOLERANCE TEST, 3HR; Status:Active;  Requested for:50Xti5657;

## 2018-01-22 VITALS
DIASTOLIC BLOOD PRESSURE: 87 MMHG | BODY MASS INDEX: 27.69 KG/M2 | WEIGHT: 151.38 LBS | SYSTOLIC BLOOD PRESSURE: 130 MMHG | HEART RATE: 81 BPM

## 2018-01-22 VITALS
SYSTOLIC BLOOD PRESSURE: 112 MMHG | BODY MASS INDEX: 30 KG/M2 | DIASTOLIC BLOOD PRESSURE: 72 MMHG | HEIGHT: 62 IN | HEART RATE: 109 BPM | WEIGHT: 163 LBS

## 2018-01-22 VITALS
DIASTOLIC BLOOD PRESSURE: 74 MMHG | HEIGHT: 62 IN | HEART RATE: 96 BPM | SYSTOLIC BLOOD PRESSURE: 126 MMHG | BODY MASS INDEX: 29.08 KG/M2 | WEIGHT: 158 LBS

## 2018-01-22 VITALS
WEIGHT: 150.5 LBS | BODY MASS INDEX: 27.53 KG/M2 | HEART RATE: 90 BPM | SYSTOLIC BLOOD PRESSURE: 132 MMHG | DIASTOLIC BLOOD PRESSURE: 85 MMHG

## 2018-01-22 VITALS
BODY MASS INDEX: 31.53 KG/M2 | WEIGHT: 167 LBS | DIASTOLIC BLOOD PRESSURE: 88 MMHG | HEIGHT: 61 IN | SYSTOLIC BLOOD PRESSURE: 118 MMHG | HEART RATE: 90 BPM

## 2018-01-22 VITALS
HEIGHT: 62 IN | HEART RATE: 100 BPM | SYSTOLIC BLOOD PRESSURE: 102 MMHG | OXYGEN SATURATION: 99 % | DIASTOLIC BLOOD PRESSURE: 62 MMHG | BODY MASS INDEX: 29.81 KG/M2 | WEIGHT: 162 LBS

## 2018-01-22 VITALS
BODY MASS INDEX: 27.64 KG/M2 | DIASTOLIC BLOOD PRESSURE: 82 MMHG | HEART RATE: 98 BPM | SYSTOLIC BLOOD PRESSURE: 122 MMHG | WEIGHT: 151.13 LBS

## 2018-01-22 VITALS
WEIGHT: 163.01 LBS | DIASTOLIC BLOOD PRESSURE: 75 MMHG | SYSTOLIC BLOOD PRESSURE: 114 MMHG | BODY MASS INDEX: 30.78 KG/M2 | HEIGHT: 61 IN

## 2018-01-22 VITALS
DIASTOLIC BLOOD PRESSURE: 80 MMHG | SYSTOLIC BLOOD PRESSURE: 135 MMHG | HEIGHT: 62 IN | WEIGHT: 149 LBS | HEART RATE: 86 BPM | BODY MASS INDEX: 27.42 KG/M2 | RESPIRATION RATE: 16 BRPM

## 2018-01-22 VITALS
HEIGHT: 62 IN | SYSTOLIC BLOOD PRESSURE: 119 MMHG | WEIGHT: 161 LBS | DIASTOLIC BLOOD PRESSURE: 79 MMHG | BODY MASS INDEX: 29.63 KG/M2

## 2018-01-22 VITALS
WEIGHT: 162.01 LBS | BODY MASS INDEX: 29.81 KG/M2 | SYSTOLIC BLOOD PRESSURE: 115 MMHG | HEIGHT: 62 IN | DIASTOLIC BLOOD PRESSURE: 79 MMHG

## 2018-01-23 NOTE — PROGRESS NOTES
2018         RE: Mahad Landis                              To: [facility]  MR#: [id number]                                   [facility]  : [address]: [id number]                             Linda Ott U  6    (Exam #: [id number])                          Fax: [phone]      The LMP of this 29year old,  G5, P2-0-2-2 patient was unknown, her   working BURTON is 2018 and the current gestational age is 27 weeks 4   days by [address] [place] [address]  A sonographic examination was performed on 2018 using real time equipment  The ultrasound examination was performed   using abdominal technique  The patient has a BMI of 32 3  Her blood   pressure today was 134/80  Earliest ultrasound found in her record: 17 8w4 and 8w6 giving an  BURTON   of 18 based on the larger of the twins        Fetus A   Cardiac motion was observed at 161 bpm       Fetus B   Cardiac motion was observed at 149 bpm       INDICATIONS      multiple gestation- monochorionic  diamniotic twins      Exam Types      Amniotic Fluid Index      RESULTS      Fetus # 1 of 2   Vertex presentation   Fetal position = Maternal Right   Placenta Location = Anterior   No placenta previa   Placenta Grade = I      Fetus # 2 of 2   Vertex presentation   Fetal position = Maternal Left, Posterior   Placenta Location = Anterior   No placenta previa   Placenta Grade = I      AMNIOTIC FLUID      Fetus A      Largest Vertical Pocket = 3 3 cm   Amniotic Fluid: Normal         Fetus B      Largest Vertical Pocket = 4 0 cm   Amniotic Fluid: Normal      UTERINE ARTERIES                                  S/D   PI    RI    NOTCH       Right Uterine Artery       4 41  1 47  0 76      FETAL VESSELS      Fetus A                                  S/D   PI    RI    PSV   AEDV RF                                                    cm/s       Umbilical Artery           2 88 0  97  0 65        No   No       Middle Cerebral Artery     3 57  1 27  0 72  55 24 No       Thoracic Aorta:                                         No         Fetus B                                  S/D   PI    RI    PSV   AEDV RF                                                    cm/s       Umbilical Artery           2 14  0 74  0 53        No   No       Middle Cerebral Artery     4 18  1 34  0 76  52 09 No      FETAL VESSELS      Fetus A                                 Pulsations       Umbilical Vein:             No      Fetus B                                 PVSys PVDia PASys  S/D   S/A   DVI   RF       Ductus Venosus:                                                No                                    Pulsations       Umbilical Vein:             No      BIOPHYSICAL PROFILE      Fetus A   The Biophysical Profile score was 10/10  Breathin  Movement: 2  Tone: 2  AFV: 2  NST: 2      Fetus B   The Biophysical Profile score was 10/10  Breathin  Movement: 2  Tone: 2  AFV: 2  NST: 2      IMPRESSION      Twin IUP (Fetus A)   35 weeks and 4 days by 1st Tri Sono  (BURTON=2018)   Vertex presentation   Regular fetal heart rate of 161 bpm   Anterior placenta   No placenta previa   Fetal position = Maternal , Right      Twin IUP (Fetus B)   35 weeks and 4 days by 1st Tri Sono  (BURTON=2018)   Vertex presentation   Regular fetal heart rate of 149 bpm   Anterior placenta   No placenta previa   Fetal position = Maternal , Left, Posterior      GENERAL COMMENT      The patient presented today for antepartum fetal surveillance secondary to   a monochorionic twin pregnancy  Biophysical profiles were reassuring on   each fetus  Fetal Dopplers are also normal for gestational age  Recommend continued twice weekly fetal testing with weekly fetal Doppler   evaluation secondary to monochorionic pregnancies although delivery is   recommended at 36 weeks        Thank very much for allowing us to participate in the care of your   patient  Should you have any questions, please do not hesitate to contact   our office  RADHA Hills , ADAN Clarke     Electronically signed 01/09/18 16:33

## 2018-01-23 NOTE — PROGRESS NOTES
DEC 15 2017         RE: [facility]                              To: [facility]  MR#: [id number]                                   [facility]  : [address]: [phone]:[address] U  6    (Exam #: [id number])                           Fax: [phone]      The LMP of this 32year old,  G5, P2-0-2-2 patient was unknown, her   working BURTON is 2018 and the current gestational age is 26 weeks 0   days by  [address] [place] [address]  A sonographic examination was performed on DEC   15 2017 using real time equipment  The ultrasound examination was   performed using abdominal technique  The patient has a BMI of 31 2  Her   blood pressure today was 132/86  Earliest ultrasound found in her record: 17 8w4 and 8w6 giving an  BURTON   of 18 based on the larger of the twins  Fetus A   Cardiac motion was observed at 159 bpm       Fetus B   Cardiac motion was observed at 153 bpm       INDICATIONS      multiple gestation- monochorionic  diamniotic twins   TTS surveillence   obesity      Exam Types      Amniotic Fluid Index      RESULTS      Fetus # 1 of 2   Vertex presentation   Fetal position = Inferior, Maternal Left   Placenta Location = Left lateral   No placenta previa   Placenta Grade = II   Chorionicity = Monochorionic, Diamniotic      Fetus # 2 of 2   Breech presentation   Fetal position = Superior, Maternal Right   Placenta Location = Anterior, left lateral   No placenta previa   Placenta Grade = II   Chorionicity = Monochorionic, Diamniotic      Fetus A   The NST was reactive with no decelerations  Fetus B   The NST was reactive with no decelerations        AMNIOTIC FLUID      Fetus A      Largest Vertical Pocket = 4 7 cm   Amniotic Fluid: Normal         Fetus B      Largest Vertical Pocket = 5 2 cm   Amniotic Fluid: Normal      FETAL VESSELS      Fetus A S/D   PI    RI    PSV   AEDV RF                                                    cm/s       Middle Cerebral Artery     8 72  2 11  0 88  49 92 No         Fetus B                                  S/D   PI    RI    PSV   AEDV RF                                                    cm/s       Middle Cerebral Artery     7 28  1 97  0 86  48 29 No      IMPRESSION      Twin IUP (Fetus A)   32 weeks and 0 days by 1st Tri Sono  (BURTON=FEB 9 2018)   Vertex presentation   Regular fetal heart rate of 159 bpm   Left lateral placenta   No placenta previa   Fetal position = Inferior, Left   Monochorionic, diamniotic      Twin IUP (Fetus B)   32 weeks and 0 days by 1st Tri Sono  (BURTON=FEB 9 2018)   Breech presentation   Regular fetal heart rate of 153 bpm   Anterior, left lateral placenta   No placenta previa   Fetal position = Superior, Right   Monochorionic, diamniotic      GENERAL COMMENT      A normal bladder and stomach are seen on both babies  Normal fluid is seen   in both sacs  Both babies are active  The umbilical and middle cerebral   artery Dopplers are normal on both babies with MOM in the range of 1 1  There is no suggestion by ultrasound today for twin to twin transfusion  RADHA Carson M D  Maternal-Fetal Medicine   Electronically signed 12/15/17 14:24           Electronically signed by:Abhishek MENG    Dec 21 2017  1:06PM EST

## 2018-01-23 NOTE — CONSULTS
I had the pleasure of evaluating your patient, [facility]  My full evaluation follows:      Chief Complaint  Here for ultrasound study      History of Present Illness  Please refer to the ultrasound report for additional information  Active Problems    1  Monochorionic diamniotic twin gestation (12 80,V80 12) (O30 80)    Past Medical History    · History of Age At First Period 15 Years Old (Menarche)   · History of Exposure To Gonorrhea (V01 6)   · History of urinary tract infection (V13 02) (Z87 440)   · Normal delivery (650) (U64,P48  9)    Family History    · No pertinent family history    · Family history of lung cancer (V16 1) (Z80 1)    · Family history of lung cancer (V16 1) (Z80 1)    · Family history of diabetes mellitus (V18 0) (Z83 3)    · Family history of lung cancer (V16 1) (Z80 1)    Social History    · Denied: History of Alcohol Use (History)   · Does not exercise (V69 0) (Z72 3)   · Denied: History of Drug Use   · Never A Smoker    Current Meds   1  Breast Pump Miscellaneous; double electric; Therapy: 33PPI3397 to (Last Rx:22Nov2017) Ordered   2  Calcium 500 MG Oral Tablet Chewable; 500 mg orally 2 times a day  Please avoid taking   it with iron tablets or with other medications with calcium in it like TUMS;   Therapy: 11YIZ5149 to (Evaluate:02Jan2018)  Requested for: 36DRE6466; Last   Rx:24Ltr8538 Ordered   3  Ferrous Sulfate 325 (65 Fe) MG Oral Tablet; Take 1 tab daily before bedtime on an empty   stomach or with orange juice; Therapy: 61QEN1011 to (Evaluate:02Jan2018)  Requested for: 17YMQ3096; Last   Rx:25Fat2047 Ordered   4  Folic Acid 1 MG Oral Tablet; TAKE 1 TABLET DAILY AS DIRECTED; Therapy: 69AEC8524 to (Evaluate:16Uzo2161)  Requested for: 37DNZ6754; Last   Rx:79Slt0303 Ordered   5  Prenatal 27-0 8 MG Oral Tablet; TAKE 1 TABLET DAILY AS DIRECTED; Therapy: 65RVC8658 to (Evaluate:30Jun2018)  Requested for: 87QPL1800; Last   Rx:54Fpt7011 Ordered    Allergies    1  Penicillins    2  No Known Environmental Allergies   3  No Known Food Allergies    Vitals   Recorded: 89CDT7176 08:39AM   Heart Rate 90   Systolic 481, LUE, Sitting   Diastolic 88, LUE, Sitting   Height 5 ft 1 in   Weight 167 lb    BMI Calculated 31 55   BSA Calculated 1 75   Pain Scale 0     Results/Data  Exam description: level I obstetrical ultrasound  Findings: twin pregnancy, Please refer to the ultrasound report for additional information  Procedure  Maternal pulse = 90     G/P 5/2   EDC 2/9/2018   EGA 34 4/7   /88   Indication: multiple gestation and mono/di/ twins  Baby A: Fetal Non Stress Test    Duration of Test 20 minutes  Result: Reactive and > 15 bpm with movement  Baseline Rate 140 bpm    Deceleration: None  Uterine Activity: Mild, Irregular  Required # Stimuli Response: No    Fetal kick counts were reviewed with the patient  Recommend NST: twice weekly  Recommend JOSIE: weekly  Fetal Non Stress Test Baby B   Duration of Test 20 minutes  Result: Reactive and > 15 bpm with movement  Baseline Rate: 140 bpm    Deceleration: None  Uterine Activity: Mild, Irregular  Biophysical Profile  Fetal Acoustic Stim Required # Stimuli Response: No    Fetal kick counts were reviewed with the patient  Recommend NST: twice weekly  Recommend JOSIE: weekly  Discussion/Summary    Please refer to the ultrasound report for additional information  The patient was counseled regarding diagnostic results, instructions for management, prognosis, impressions  Thank you very much for allowing me to participate in the care of this patient  If you have any questions, please do not hesitate to contact me        Future Appointments    Signatures   Electronically signed by : ADAN Hughes ; Jan 2 2018  4:33PM EST                       (Author)

## 2018-01-23 NOTE — PROGRESS NOTES
DEC 22 2017         RE: Boogie Graevs                              To: [facility]  MR#: [id number]                                   [facility]  : [address]: [id number]                             Linda Graff U  6    (Exam #: [id number])                          Fax: [phone]      The LMP of this 32year old,  G5, P2-0-2-2 patient was unknown, her   working BUTRON is 2018 and the current gestational age is 29 weeks 0   days by [address] [place] [address]  A sonographic examination was performed on DEC   22 2017 using real time equipment  The ultrasound examination was   performed using abdominal technique  Earliest ultrasound found in her record: 17 8w4 and 8w6 giving an  BURTON   of 18 based on the larger of the twins  Fetus A   Cardiac motion was observed at 160 bpm       Fetus B   Cardiac motion was observed at 153 bpm       INDICATIONS      multiple gestation- monochorionic  diamniotic twins   TTS surveillence   obesity      Exam Types      Amniotic Fluid Index      RESULTS      Fetus # 1 of 2   Vertex presentation   Fetal position = Inferior, Maternal Right, Posterior   Placenta Location = Posterior   No placenta previa   Placenta Grade = II   Chorionicity = Monochorionic, Diamniotic      Fetus # 2 of 2   Breech presentation   Fetal position = Superior, Maternal Left, Anterior   Placenta Location = Left lateral   No placenta previa   Placenta Grade = II   Chorionicity = Monochorionic, Diamniotic      Fetus A   The NST was reactive with no decelerations  Fetus B   The NST was reactive with no decelerations        AMNIOTIC FLUID      Fetus A      Largest Vertical Pocket = 4 9 cm   Amniotic Fluid: Normal         Fetus B      Largest Vertical Pocket = 6 5 cm   Amniotic Fluid: Normal      FETAL VESSELS      Fetus A                                  S/D   PI    RI    PSV   AEDV RF cm/s       Middle Cerebral Artery     6 78  1 84  0 85  48 00         Fetus B                                  S/D   PI    RI    PSV   AEDV RF                                                    cm/s       Middle Cerebral Artery     5 81  1 84  0 83  50 00      ANATOMY DETAILS      Fetus A   Visualized Appearing Sonographically Normal:   STOMACH, BLADDER      Fetus B   Visualized Appearing Sonographically Normal:   STOMACH, BLADDER      IMPRESSION      Twin IUP (Fetus A)   33 weeks and 0 days by 1st Tri Sono  (BURTON=2018)   Vertex presentation   Regular fetal heart rate of 160 bpm   Posterior placenta   No placenta previa   Fetal position = Inferior, Right, Posterior   Monochorionic, diamniotic      Twin IUP (Fetus B)   33 weeks and 0 days by 1st Tri Sono  (BURTON=2018)   Breech presentation   Regular fetal heart rate of 153 bpm   Left lateral placenta   No placenta previa   Fetal position = Superior, Left, Anterior   Monochorionic, diamniotic      GENERAL COMMENT        A nonstress test was reactive on each fetus  The amniotic fluid is   reassuring around each fetus  The middle cerebral artery Dopplers   indicate no concern for evolving twin anemia polycythemia sequence  A   bladder and stomach are appreciated on each fetus  There is no evidence   to suggest evolving twin-twin transfusion  Recommend continuation of   twice weekly  surveillance for the indication of monochorionic   twins  Thank you very much for allowing us to participate in the care of this   very nice patient  Should you have any questions, please do not hesitate   to contact our office  RECOMMENDATION      JOSIE: 1 Week   NST: 2X per week      RADHA Ordonez M D     Electronically signed 17 13:40

## 2018-01-23 NOTE — MISCELLANEOUS
Message   Recorded as Task   Date: 01/17/2018 04:29 PM, Created By: System   Task Name: Hospital TREE   Assigned To: Kimber Stone   Regarding Patient: Jose Hernandez, Status: Active   Comment:    System - 17 Jan 2018 4:29 PM     Patient discharged from hospital   Patient Name: Marissa Gutierres  Patient YOB: 1990  Discharge Date: 1/17/2018  Facility: [facility] - 18 Jan 2018 8:08 AM     TASK REASSIGNED: Previously Assigned To Alice Lewis - 18 Jan 2018 9:52 AM     TASK EDITED  Not PCP  No TREE f/u needed  Active Problems    1  Monochorionic diamniotic twin gestation (651 00,V91 02) (O30 039)    Current Meds   1  Breast Pump Miscellaneous; double electric; Therapy: 30SHH2892 to (Last Rx:22Nov2017) Ordered   2  Calcium 500 MG Oral Tablet Chewable; 500 mg orally 2 times a day  Please avoid taking   it with iron tablets or with other medications with calcium in it like TUMS;   Therapy: 21VVB4728 to (Evaluate:02Jan2018)  Requested for: 11QDS7513; Last   Rx:47Kfx6438 Ordered   3  Ferrous Sulfate 325 (65 Fe) MG Oral Tablet; Take 1 tab daily before bedtime on an   empty stomach or with orange juice; Therapy: 68YFL0395 to (Evaluate:02Jan2018)  Requested for: 55NQG1462; Last   Rx:36Mdw0540 Ordered   4  Folic Acid 1 MG Oral Tablet; TAKE 1 TABLET DAILY AS DIRECTED; Therapy: 84HRL8897 to (Evaluate:61Apk0532)  Requested for: 64JHX0963; Last   Rx:53Wql5260 Ordered   5  Prenatal 27-0 8 MG Oral Tablet; TAKE 1 TABLET DAILY AS DIRECTED; Therapy: 19EVX3808 to (Evaluate:30Jun2018)  Requested for: 99VYS9515; Last   Rx:52Rcl2834 Ordered    Allergies    1  Penicillins    2  No Known Environmental Allergies   3   No Known Food Allergies    Signatures   Electronically signed by : Colt Ma, ; Jan 18 2018  9:52AM EST                       (Author)

## 2018-01-23 NOTE — PROGRESS NOTES
2018         RE: Adalberto Sánchez                              To: [facility]  MR#: [id number]                                   [facility]  : [address]: [id number]                             [address] [address]  [address]    (Exam #: [id number])                          Fax: [phone]      The LMP of this 32year old,  G5, P2-0-2-2 patient was unknown, her   working BURTON is 2018 and the current gestational age is 29 weeks 4   days by  [address] [place] [address]  A sonographic examination was performed on 2018 using real time equipment  The ultrasound examination was performed   using abdominal technique  The patient has a BMI of 31 6  Her blood   pressure today was 118/88  Earliest ultrasound found in her record: 17 8w4 and 8w6 giving an  BURTON   of 18 based on the larger of the twins  Mary Andre has no complaints today  She reports regular fetal movements  She   denies problems related to vaginal bleeding, labor, hypertension, or   gestational diabetes  Fetus A   Cardiac motion was observed at 153 bpm       Fetus B   Cardiac motion was observed at 150 bpm       INDICATIONS      multiple gestation- monochorionic  diamniotic twins      Exam Types      Level I   NST      RESULTS      Fetus # 1 of 2   Vertex presentation   Fetal position = Inferior, Maternal Right   Placenta Location = Posterior   No placenta previa   Placenta Grade = I   Chorionicity = Monochorionic, Diamniotic      Fetus # 2 of 2   Vertex presentation   Fetal position = Superior, Maternal Left   Placenta Location = Left lateral   No placenta previa   Placenta Grade = I   Chorionicity = Monochorionic, Diamniotic      Fetus A   The NST was reactive with no decelerations  Fetus B   The NST was reactive with no decelerations        MEASUREMENTS (* Included In Average GA)      FETUS A   AC              27 2 cm        31 weeks 2 days* (<5%)   BPD              8 0 cm        32 weeks 0 days* (<5%)   HC              30 0 cm        32 weeks 6 days* (6%)   Femur            6 3 cm        32 weeks 2 days* (11%)      Cerebellum       4 3 cm        34 weeks 5 days      HC/AC           1 11   FL/AC           0 23   FL/BPD          0 79   EFW (Ac/Fl/Hc)  1872 grams - 4 lbs 2 oz                 (13%)      Fetus A AVERAGE G  A  is 32 weeks 1 day +/- 21 days  FETUS B   AC              27 4 cm        31 weeks 4 days* (<5%)   BPD              8 4 cm        33 weeks 6 days* (29%)   HC              31 0 cm        34 weeks 1 day * (23%)   Femur            6 1 cm        31 weeks 5 days* (6%)      Cerebellum       4 3 cm        34 weeks 5 days      HC/AC           1 13   FL/AC           0 22   FL/BPD          0 73   EFW (Ac/Fl/Hc)  1899 grams - 4 lbs 3 oz                 (15%)      Fetus B AVERAGE G  A  is 32 weeks 6 days +/- 21 days  AMNIOTIC FLUID      Fetus A      Largest Vertical Pocket = 5 3 cm   Amniotic Fluid: Normal         Fetus B      Largest Vertical Pocket = 5 3 cm   Amniotic Fluid: Normal      FETAL VESSELS      Fetus A                                  S/D   PI    RI    PSV   AEDV RF                                                    cm/s       Umbilical Artery                 1 21              No   No       Middle Cerebral Artery           1 59        61 70         Fetus B                                  S/D   PI    RI    PSV   AEDV RF                                                    cm/s       Umbilical Artery                 1 19              No   No       Middle Cerebral Artery           1 77        58 80      ANATOMY DETAILS      Fetus A   Visualized Appearing Sonographically Normal:   HEAD: (Calvarium, BPD Level, Cavum, Lateral Ventricles, Cerebellum,   Cisterna Magna);    TH  CAV : (Diaphragm); HEART: (Four Chamber View,   Proximal Left Outflow, Proximal Right Outflow, 3VV);     STOMACH, RIGHT   KIDNEY, LEFT KIDNEY, BLADDER, PLACENTA      Fetus B   Visualized Appearing Sonographically Normal:   HEAD: (Calvarium, BPD Level, Cavum, Lateral Ventricles, Cerebellum,   Cisterna Magna);    TH  CAV : (Diaphragm); HEART: (Four Chamber View,   Proximal Left Outflow, Proximal Right Outflow, 3VV); STOMACH, RIGHT   KIDNEY, LEFT KIDNEY, BLADDER, PLACENTA      IMPRESSION      Twin IUP (Fetus A)   32 weeks and 1 day by this ultrasound  (BURTON=FEB 26 2018)   34 weeks and 4 days by 1st Tri Sono  (BURTON=FEB 9 2018)   Vertex presentation   Regular fetal heart rate of 153 bpm   Posterior placenta   No placenta previa   Fetal position = Inferior, Right   Monochorionic, diamniotic      Twin IUP (Fetus B)   32 weeks and 6 days by this ultrasound  (BURTON=FEB 21 2018)   34 weeks and 4 days by 1st Tri Sono  (BURTON=FEB 9 2018)   Vertex presentation   Regular fetal heart rate of 150 bpm   Left lateral placenta   No placenta previa   Fetal position = Superior, Left   Monochorionic, diamniotic      GENERAL COMMENT      No fetal structural abnormality is identified on the Level I survey today  Estimated fetal weights for fetuses "A" and "B" are placed at the 13th and   15th percentiles, respectively, for this gestational age  The abdominal   circumference size for each fetus is measured at less than the 5th   percentile for gestational age  Interval growth, as compared with her most   recent MFM evaluation, has been somewhat suboptimal  The amniotic fluid   volumes are normal  The fetal umbilical artery and middle cerebral artery   Doppler studies are normal  There is no suspicion of evolving twin to twin   transfusion syndrome  There is no suspicion of evolving twin anemia   polycythemia sequence  The middle cerebral artery peak systolic velocities   are measured at 1 26 MoM and 1 20 MoM, respectively, for fetuses "A" and   "B"  Today's ultrasound findings and suggested follow-up were discussed in   detail with [facility]   Continuation of twice per week non stress testing   and weekly amniotic fluid volume assessments is recommended  Given the   biometry obtained on today's study, consideration of delivery of this   monochorionic, diamniotic twin pregnancy is suggested at about 36 weeks   gestation  The face to face time, in addition to time spent discussing ultrasound   results, was approximately 10 minutes, greater than 50% of which was spent   during counseling and coordination of care  RADHA Daniel M D     Maternal-Fetal Medicine   Electronically signed 01/02/18 16:42

## 2018-01-24 VITALS
HEIGHT: 61 IN | BODY MASS INDEX: 31.91 KG/M2 | WEIGHT: 169 LBS | SYSTOLIC BLOOD PRESSURE: 122 MMHG | DIASTOLIC BLOOD PRESSURE: 72 MMHG

## 2018-01-24 VITALS
DIASTOLIC BLOOD PRESSURE: 86 MMHG | HEART RATE: 88 BPM | HEIGHT: 61 IN | WEIGHT: 165.03 LBS | BODY MASS INDEX: 31.16 KG/M2 | SYSTOLIC BLOOD PRESSURE: 132 MMHG

## 2018-01-24 VITALS
HEIGHT: 61 IN | SYSTOLIC BLOOD PRESSURE: 131 MMHG | HEART RATE: 94 BPM | DIASTOLIC BLOOD PRESSURE: 79 MMHG | WEIGHT: 166.04 LBS | BODY MASS INDEX: 31.35 KG/M2

## 2018-01-24 VITALS — DIASTOLIC BLOOD PRESSURE: 70 MMHG | SYSTOLIC BLOOD PRESSURE: 118 MMHG | HEIGHT: 61 IN

## 2018-01-24 VITALS
HEART RATE: 93 BPM | WEIGHT: 170.05 LBS | DIASTOLIC BLOOD PRESSURE: 80 MMHG | HEIGHT: 61 IN | BODY MASS INDEX: 32.1 KG/M2 | SYSTOLIC BLOOD PRESSURE: 134 MMHG

## 2018-03-07 NOTE — PROGRESS NOTES
Education  Baby & Me Education 1st Trimester:   First Trimester Education provided:   benefits of breastfeeding, importance of exclusive breastfeeding, early initiation of breastfeeding and Pregnancy Essentials Reference Guide given   The patient is planning on breastfeeding     Prenatal education provided by: Ruchi Peters      Signatures   Electronically signed by : Ruchi Peters, ; Jul 5 2017  1:53PM EST                       (Author)

## 2018-03-07 NOTE — PROGRESS NOTES
Education  Baby & Me Education 2nd Trimester:   Second Trimester Education provided:   benefits of breastfeeding, importance of exclusive breastfeeding, early initiation of breastfeeding, exclusive breastfeeding for the first 6 months, non-pharmacologic pain relief methods for labor and rooming-in on 24 hour basis  Mother has not registered for prenatal class  Thought has been given to selecting a pediatrician  The mother has discussed personal preferences with her provider     Prenatal education provided by: Jaqui Castillo   Electronically signed by : ADAN Joshua ; Nov 8 2017 12:16PM EST                       (Author)

## (undated) DEVICE — ADHESIVE SKN CLSR HISTOACRYL FLEX 0.5ML LF

## (undated) DEVICE — SUT PLAIN 2-0 CTX 27 IN 872H

## (undated) DEVICE — SUT VICRYL 0 CTX 36 IN J978H

## (undated) DEVICE — SUT VICRYL 4-0 KS 27 IN J662H

## (undated) DEVICE — GLOVE INDICATOR PI UNDERGLOVE SZ 7 BLUE

## (undated) DEVICE — MEDI-VAC YANKAUER SUCTION HANDLE W/STRAIGHT TIP & CONTROL VENT: Brand: CARDINAL HEALTH

## (undated) DEVICE — SUT VICRYL 0 CT-1 27 IN J260H

## (undated) DEVICE — SKIN MARKER DUAL TIP WITH RULER CAP, FLEXIBLE RULER AND LABELS: Brand: DEVON

## (undated) DEVICE — CHLORAPREP HI-LITE 26ML ORANGE

## (undated) DEVICE — Device

## (undated) DEVICE — GLOVE SRG BIOGEL ECLIPSE 7

## (undated) DEVICE — PACK C-SECTION PBDS